# Patient Record
Sex: MALE | Race: WHITE | NOT HISPANIC OR LATINO | Employment: OTHER | URBAN - METROPOLITAN AREA
[De-identification: names, ages, dates, MRNs, and addresses within clinical notes are randomized per-mention and may not be internally consistent; named-entity substitution may affect disease eponyms.]

---

## 2018-11-12 ENCOUNTER — OFFICE VISIT CONVERTED (OUTPATIENT)
Dept: GASTROENTEROLOGY | Facility: CLINIC | Age: 68
End: 2018-11-12
Attending: PHYSICIAN ASSISTANT

## 2021-05-16 VITALS
HEART RATE: 66 BPM | WEIGHT: 187 LBS | BODY MASS INDEX: 29.35 KG/M2 | HEIGHT: 67 IN | OXYGEN SATURATION: 100 % | SYSTOLIC BLOOD PRESSURE: 127 MMHG | DIASTOLIC BLOOD PRESSURE: 67 MMHG

## 2022-09-06 ENCOUNTER — APPOINTMENT (OUTPATIENT)
Dept: CARDIOLOGY | Facility: HOSPITAL | Age: 72
End: 2022-09-06

## 2022-09-06 ENCOUNTER — APPOINTMENT (OUTPATIENT)
Dept: CT IMAGING | Facility: HOSPITAL | Age: 72
End: 2022-09-06

## 2022-09-06 ENCOUNTER — APPOINTMENT (OUTPATIENT)
Dept: GENERAL RADIOLOGY | Facility: HOSPITAL | Age: 72
End: 2022-09-06

## 2022-09-06 ENCOUNTER — HOSPITAL ENCOUNTER (INPATIENT)
Facility: HOSPITAL | Age: 72
LOS: 3 days | Discharge: HOME | End: 2022-09-09
Attending: EMERGENCY MEDICINE | Admitting: FAMILY MEDICINE

## 2022-09-06 DIAGNOSIS — R26.2 DIFFICULTY IN WALKING: ICD-10-CM

## 2022-09-06 DIAGNOSIS — I26.99 MULTIPLE PULMONARY EMBOLI: Primary | ICD-10-CM

## 2022-09-06 DIAGNOSIS — I82.4Y2 ACUTE DEEP VEIN THROMBOSIS (DVT) OF PROXIMAL VEIN OF LEFT LOWER EXTREMITY: ICD-10-CM

## 2022-09-06 LAB
ALBUMIN SERPL-MCNC: 3.6 G/DL (ref 3.5–5.2)
ALBUMIN/GLOB SERPL: 1.1 G/DL
ALP SERPL-CCNC: 64 U/L (ref 39–117)
ALT SERPL W P-5'-P-CCNC: 15 U/L (ref 1–41)
ANION GAP SERPL CALCULATED.3IONS-SCNC: 8.9 MMOL/L (ref 5–15)
ANION GAP SERPL CALCULATED.3IONS-SCNC: 9.3 MMOL/L (ref 5–15)
APTT PPP: 27.5 SECONDS (ref 24.2–34.2)
AST SERPL-CCNC: 14 U/L (ref 1–40)
BASOPHILS # BLD AUTO: 0.04 10*3/MM3 (ref 0–0.2)
BASOPHILS # BLD AUTO: 0.05 10*3/MM3 (ref 0–0.2)
BASOPHILS NFR BLD AUTO: 0.5 % (ref 0–1.5)
BASOPHILS NFR BLD AUTO: 0.7 % (ref 0–1.5)
BH CV LOW VAS LEFT COMMON FEMORAL SPONT: 1
BH CV LOW VAS LEFT DISTAL FEMORAL SPONT: 1
BH CV LOW VAS LEFT EXTERNAL ILIAC AUGMENT: NORMAL
BH CV LOW VAS LEFT EXTERNAL ILIAC COMPRESS: NORMAL
BH CV LOW VAS LEFT GASTRONEMIUS VESSEL: 1
BH CV LOW VAS LEFT MID FEMORAL SPONT: 1
BH CV LOW VAS LEFT PERONEAL VESSEL: 1
BH CV LOW VAS LEFT POPLITEAL SPONT: 1
BH CV LOW VAS LEFT POSTERIOR TIBIAL VESSEL: 1
BH CV LOW VAS LEFT PROXIMAL FEMORAL SPONT: 1
BH CV LOW VAS LEFT SOLEAL VESSEL: 1
BH CV LOWER VASCULAR LEFT COMMON FEMORAL AUGMENT: NORMAL
BH CV LOWER VASCULAR LEFT COMMON FEMORAL COMPETENT: NORMAL
BH CV LOWER VASCULAR LEFT COMMON FEMORAL COMPRESS: NORMAL
BH CV LOWER VASCULAR LEFT COMMON FEMORAL PHASIC: NORMAL
BH CV LOWER VASCULAR LEFT COMMON FEMORAL SPONT: NORMAL
BH CV LOWER VASCULAR LEFT COMMON FEMORAL THROMBUS: NORMAL
BH CV LOWER VASCULAR LEFT DISTAL FEMORAL AUGMENT: NORMAL
BH CV LOWER VASCULAR LEFT DISTAL FEMORAL COMPETENT: NORMAL
BH CV LOWER VASCULAR LEFT DISTAL FEMORAL COMPRESS: NORMAL
BH CV LOWER VASCULAR LEFT DISTAL FEMORAL PHASIC: NORMAL
BH CV LOWER VASCULAR LEFT DISTAL FEMORAL SPONT: NORMAL
BH CV LOWER VASCULAR LEFT DISTAL FEMORAL THROMBUS: NORMAL
BH CV LOWER VASCULAR LEFT EXTERNAL ILIAC COMPETENT: NORMAL
BH CV LOWER VASCULAR LEFT EXTERNAL ILIAC PHASIC: NORMAL
BH CV LOWER VASCULAR LEFT EXTERNAL ILIAC SPONT: NORMAL
BH CV LOWER VASCULAR LEFT GASTRONEMIUS COMPRESS: NORMAL
BH CV LOWER VASCULAR LEFT GASTRONEMIUS THROMBUS: NORMAL
BH CV LOWER VASCULAR LEFT GREATER SAPH AK COMPRESS: NORMAL
BH CV LOWER VASCULAR LEFT GREATER SAPH BK COMPRESS: NORMAL
BH CV LOWER VASCULAR LEFT MID FEMORAL AUGMENT: NORMAL
BH CV LOWER VASCULAR LEFT MID FEMORAL COMPETENT: NORMAL
BH CV LOWER VASCULAR LEFT MID FEMORAL COMPRESS: NORMAL
BH CV LOWER VASCULAR LEFT MID FEMORAL PHASIC: NORMAL
BH CV LOWER VASCULAR LEFT MID FEMORAL SPONT: NORMAL
BH CV LOWER VASCULAR LEFT MID FEMORAL THROMBUS: NORMAL
BH CV LOWER VASCULAR LEFT PERONEAL COMPRESS: NORMAL
BH CV LOWER VASCULAR LEFT PERONEAL THROMBUS: NORMAL
BH CV LOWER VASCULAR LEFT POPLITEAL AUGMENT: NORMAL
BH CV LOWER VASCULAR LEFT POPLITEAL COMPETENT: NORMAL
BH CV LOWER VASCULAR LEFT POPLITEAL COMPRESS: NORMAL
BH CV LOWER VASCULAR LEFT POPLITEAL PHASIC: NORMAL
BH CV LOWER VASCULAR LEFT POPLITEAL SPONT: NORMAL
BH CV LOWER VASCULAR LEFT POPLITEAL THROMBUS: NORMAL
BH CV LOWER VASCULAR LEFT POSTERIOR TIBIAL COMPRESS: NORMAL
BH CV LOWER VASCULAR LEFT POSTERIOR TIBIAL THROMBUS: NORMAL
BH CV LOWER VASCULAR LEFT PROXIMAL FEMORAL AUGMENT: NORMAL
BH CV LOWER VASCULAR LEFT PROXIMAL FEMORAL COMPETENT: NORMAL
BH CV LOWER VASCULAR LEFT PROXIMAL FEMORAL COMPRESS: NORMAL
BH CV LOWER VASCULAR LEFT PROXIMAL FEMORAL PHASIC: NORMAL
BH CV LOWER VASCULAR LEFT PROXIMAL FEMORAL SPONT: NORMAL
BH CV LOWER VASCULAR LEFT PROXIMAL FEMORAL THROMBUS: NORMAL
BH CV LOWER VASCULAR LEFT SAPHENOFEMORAL JUNCTION COMPRESS: NORMAL
BH CV LOWER VASCULAR LEFT SOLEAL COMPRESS: NORMAL
BH CV LOWER VASCULAR LEFT SOLEAL THROMBUS: NORMAL
BH CV LOWER VASCULAR RIGHT COMMON FEMORAL AUGMENT: NORMAL
BH CV LOWER VASCULAR RIGHT COMMON FEMORAL COMPETENT: NORMAL
BH CV LOWER VASCULAR RIGHT COMMON FEMORAL COMPRESS: NORMAL
BH CV LOWER VASCULAR RIGHT COMMON FEMORAL PHASIC: NORMAL
BH CV LOWER VASCULAR RIGHT COMMON FEMORAL SPONT: NORMAL
BILIRUB SERPL-MCNC: 0.6 MG/DL (ref 0–1.2)
BUN SERPL-MCNC: 21 MG/DL (ref 8–23)
BUN SERPL-MCNC: 22 MG/DL (ref 8–23)
BUN/CREAT SERPL: 15.9 (ref 7–25)
BUN/CREAT SERPL: 16.3 (ref 7–25)
CALCIUM SPEC-SCNC: 8.8 MG/DL (ref 8.6–10.5)
CALCIUM SPEC-SCNC: 9 MG/DL (ref 8.6–10.5)
CHLORIDE SERPL-SCNC: 105 MMOL/L (ref 98–107)
CHLORIDE SERPL-SCNC: 105 MMOL/L (ref 98–107)
CO2 SERPL-SCNC: 21.7 MMOL/L (ref 22–29)
CO2 SERPL-SCNC: 25.1 MMOL/L (ref 22–29)
CREAT SERPL-MCNC: 1.29 MG/DL (ref 0.76–1.27)
CREAT SERPL-MCNC: 1.38 MG/DL (ref 0.76–1.27)
DEPRECATED RDW RBC AUTO: 37.4 FL (ref 37–54)
DEPRECATED RDW RBC AUTO: 40.4 FL (ref 37–54)
EGFRCR SERPLBLD CKD-EPI 2021: 55 ML/MIN/1.73
EGFRCR SERPLBLD CKD-EPI 2021: 59.6 ML/MIN/1.73
EOSINOPHIL # BLD AUTO: 0.43 10*3/MM3 (ref 0–0.4)
EOSINOPHIL # BLD AUTO: 0.43 10*3/MM3 (ref 0–0.4)
EOSINOPHIL NFR BLD AUTO: 5.5 % (ref 0.3–6.2)
EOSINOPHIL NFR BLD AUTO: 5.7 % (ref 0.3–6.2)
ERYTHROCYTE [DISTWIDTH] IN BLOOD BY AUTOMATED COUNT: 12.2 % (ref 12.3–15.4)
ERYTHROCYTE [DISTWIDTH] IN BLOOD BY AUTOMATED COUNT: 12.3 % (ref 12.3–15.4)
GLOBULIN UR ELPH-MCNC: 3.3 GM/DL
GLUCOSE SERPL-MCNC: 117 MG/DL (ref 65–99)
GLUCOSE SERPL-MCNC: 89 MG/DL (ref 65–99)
HCT VFR BLD AUTO: 38.8 % (ref 37.5–51)
HCT VFR BLD AUTO: 39.7 % (ref 37.5–51)
HGB BLD-MCNC: 12.6 G/DL (ref 13–17.7)
HGB BLD-MCNC: 13.3 G/DL (ref 13–17.7)
HOLD SPECIMEN: NORMAL
HOLD SPECIMEN: NORMAL
IMM GRANULOCYTES # BLD AUTO: 0.04 10*3/MM3 (ref 0–0.05)
IMM GRANULOCYTES # BLD AUTO: 0.05 10*3/MM3 (ref 0–0.05)
IMM GRANULOCYTES NFR BLD AUTO: 0.5 % (ref 0–0.5)
IMM GRANULOCYTES NFR BLD AUTO: 0.6 % (ref 0–0.5)
INR PPP: 1.12 (ref 0.86–1.15)
LYMPHOCYTES # BLD AUTO: 1.21 10*3/MM3 (ref 0.7–3.1)
LYMPHOCYTES # BLD AUTO: 1.64 10*3/MM3 (ref 0.7–3.1)
LYMPHOCYTES NFR BLD AUTO: 15.6 % (ref 19.6–45.3)
LYMPHOCYTES NFR BLD AUTO: 21.9 % (ref 19.6–45.3)
MAXIMAL PREDICTED HEART RATE: 150 BPM
MCH RBC QN AUTO: 28.6 PG (ref 26.6–33)
MCH RBC QN AUTO: 29.4 PG (ref 26.6–33)
MCHC RBC AUTO-ENTMCNC: 32.5 G/DL (ref 31.5–35.7)
MCHC RBC AUTO-ENTMCNC: 33.5 G/DL (ref 31.5–35.7)
MCV RBC AUTO: 85.4 FL (ref 79–97)
MCV RBC AUTO: 90.4 FL (ref 79–97)
MONOCYTES # BLD AUTO: 0.6 10*3/MM3 (ref 0.1–0.9)
MONOCYTES # BLD AUTO: 0.61 10*3/MM3 (ref 0.1–0.9)
MONOCYTES NFR BLD AUTO: 7.9 % (ref 5–12)
MONOCYTES NFR BLD AUTO: 8 % (ref 5–12)
NEUTROPHILS NFR BLD AUTO: 4.73 10*3/MM3 (ref 1.7–7)
NEUTROPHILS NFR BLD AUTO: 5.43 10*3/MM3 (ref 1.7–7)
NEUTROPHILS NFR BLD AUTO: 63.2 % (ref 42.7–76)
NEUTROPHILS NFR BLD AUTO: 69.9 % (ref 42.7–76)
NRBC BLD AUTO-RTO: 0 /100 WBC (ref 0–0.2)
NRBC BLD AUTO-RTO: 0 /100 WBC (ref 0–0.2)
NT-PROBNP SERPL-MCNC: 95 PG/ML (ref 0–900)
PLATELET # BLD AUTO: 112 10*3/MM3 (ref 140–450)
PLATELET # BLD AUTO: 145 10*3/MM3 (ref 140–450)
PMV BLD AUTO: 9.4 FL (ref 6–12)
PMV BLD AUTO: 9.4 FL (ref 6–12)
POTASSIUM SERPL-SCNC: 4.1 MMOL/L (ref 3.5–5.2)
POTASSIUM SERPL-SCNC: 4.1 MMOL/L (ref 3.5–5.2)
PROT SERPL-MCNC: 6.9 G/DL (ref 6–8.5)
PROTHROMBIN TIME: 14.6 SECONDS (ref 11.8–14.9)
RBC # BLD AUTO: 4.29 10*6/MM3 (ref 4.14–5.8)
RBC # BLD AUTO: 4.65 10*6/MM3 (ref 4.14–5.8)
SODIUM SERPL-SCNC: 136 MMOL/L (ref 136–145)
SODIUM SERPL-SCNC: 139 MMOL/L (ref 136–145)
STRESS TARGET HR: 128 BPM
TROPONIN T SERPL-MCNC: <0.01 NG/ML (ref 0–0.03)
TROPONIN T SERPL-MCNC: <0.01 NG/ML (ref 0–0.03)
WBC NRBC COR # BLD: 7.49 10*3/MM3 (ref 3.4–10.8)
WBC NRBC COR # BLD: 7.77 10*3/MM3 (ref 3.4–10.8)
WHOLE BLOOD HOLD COAG: NORMAL
WHOLE BLOOD HOLD SPECIMEN: NORMAL

## 2022-09-06 PROCEDURE — 93005 ELECTROCARDIOGRAM TRACING: CPT | Performed by: EMERGENCY MEDICINE

## 2022-09-06 PROCEDURE — 80053 COMPREHEN METABOLIC PANEL: CPT | Performed by: STUDENT IN AN ORGANIZED HEALTH CARE EDUCATION/TRAINING PROGRAM

## 2022-09-06 PROCEDURE — 84484 ASSAY OF TROPONIN QUANT: CPT | Performed by: STUDENT IN AN ORGANIZED HEALTH CARE EDUCATION/TRAINING PROGRAM

## 2022-09-06 PROCEDURE — 85730 THROMBOPLASTIN TIME PARTIAL: CPT | Performed by: EMERGENCY MEDICINE

## 2022-09-06 PROCEDURE — 93971 EXTREMITY STUDY: CPT

## 2022-09-06 PROCEDURE — 99284 EMERGENCY DEPT VISIT MOD MDM: CPT

## 2022-09-06 PROCEDURE — 94799 UNLISTED PULMONARY SVC/PX: CPT

## 2022-09-06 PROCEDURE — 71260 CT THORAX DX C+: CPT

## 2022-09-06 PROCEDURE — 85610 PROTHROMBIN TIME: CPT | Performed by: EMERGENCY MEDICINE

## 2022-09-06 PROCEDURE — 93971 EXTREMITY STUDY: CPT | Performed by: SURGERY

## 2022-09-06 PROCEDURE — 84484 ASSAY OF TROPONIN QUANT: CPT | Performed by: EMERGENCY MEDICINE

## 2022-09-06 PROCEDURE — 93005 ELECTROCARDIOGRAM TRACING: CPT

## 2022-09-06 PROCEDURE — 0 IOPAMIDOL PER 1 ML: Performed by: EMERGENCY MEDICINE

## 2022-09-06 PROCEDURE — 71045 X-RAY EXAM CHEST 1 VIEW: CPT

## 2022-09-06 PROCEDURE — 85025 COMPLETE CBC W/AUTO DIFF WBC: CPT | Performed by: EMERGENCY MEDICINE

## 2022-09-06 PROCEDURE — 25010000002 HEPARIN (PORCINE) 25000-0.45 UT/250ML-% SOLUTION: Performed by: EMERGENCY MEDICINE

## 2022-09-06 PROCEDURE — 99222 1ST HOSP IP/OBS MODERATE 55: CPT | Performed by: STUDENT IN AN ORGANIZED HEALTH CARE EDUCATION/TRAINING PROGRAM

## 2022-09-06 PROCEDURE — 83880 ASSAY OF NATRIURETIC PEPTIDE: CPT | Performed by: EMERGENCY MEDICINE

## 2022-09-06 RX ORDER — HEPARIN SODIUM 10000 [USP'U]/100ML
18 INJECTION, SOLUTION INTRAVENOUS
Status: DISCONTINUED | OUTPATIENT
Start: 2022-09-06 | End: 2022-09-08

## 2022-09-06 RX ORDER — SODIUM CHLORIDE 0.9 % (FLUSH) 0.9 %
10 SYRINGE (ML) INJECTION AS NEEDED
Status: DISCONTINUED | OUTPATIENT
Start: 2022-09-06 | End: 2022-09-09 | Stop reason: HOSPADM

## 2022-09-06 RX ORDER — NITROGLYCERIN 0.4 MG/1
0.4 TABLET SUBLINGUAL
Status: DISCONTINUED | OUTPATIENT
Start: 2022-09-06 | End: 2022-09-09 | Stop reason: HOSPADM

## 2022-09-06 RX ORDER — SODIUM CHLORIDE 9 MG/ML
40 INJECTION, SOLUTION INTRAVENOUS AS NEEDED
Status: DISCONTINUED | OUTPATIENT
Start: 2022-09-06 | End: 2022-09-09 | Stop reason: HOSPADM

## 2022-09-06 RX ORDER — SODIUM CHLORIDE 0.9 % (FLUSH) 0.9 %
10 SYRINGE (ML) INJECTION EVERY 12 HOURS SCHEDULED
Status: DISCONTINUED | OUTPATIENT
Start: 2022-09-07 | End: 2022-09-09 | Stop reason: HOSPADM

## 2022-09-06 RX ADMIN — IOPAMIDOL 100 ML: 755 INJECTION, SOLUTION INTRAVENOUS at 20:25

## 2022-09-06 RX ADMIN — Medication 10 ML: at 23:20

## 2022-09-06 RX ADMIN — HEPARIN SODIUM 18 UNITS/KG/HR: 10000 INJECTION, SOLUTION INTRAVENOUS at 23:06

## 2022-09-06 NOTE — ED PROVIDER NOTES
"Time: 5:17 PM EDT  Arrived by: private car  Chief Complaint: leg swelling, shortness of breath  History provided by: patient  History is limited by: N/A     History of Present Illness:  Patient is a 72 y.o. year old male who presents to the emergency department with leg swelling and shortness of breath.    Pt states he has left leg swelling starting 4 days ago with increasing pain. Pt states he has never had this problem before and denies previous blood clots and denies taking any blood thinners. Pt states he has also had some difficulty taking a deep breath.     Pt states he had covid two weeks ago.       Similar Symptoms Previously: no  Recently seen: no      Patient Care Team  Primary Care Provider: Provider, No Known    Past Medical History:     Not on File  No past medical history on file.  No past surgical history on file.  No family history on file.    Home Medications:  Prior to Admission medications    Not on File        Social History:      Recent travel: not applicable     Review of Systems:  Review of Systems   Constitutional: Negative for chills and fever.   HENT: Negative for congestion, ear pain and sore throat.    Eyes: Negative for pain.   Respiratory: Positive for shortness of breath. Negative for cough and chest tightness.    Cardiovascular: Negative for chest pain.   Gastrointestinal: Negative for abdominal pain, diarrhea, nausea and vomiting.   Genitourinary: Negative for flank pain and hematuria.   Musculoskeletal: Negative for joint swelling.        Leg swelling   Skin: Negative for pallor.   Neurological: Negative for seizures and headaches.   All other systems reviewed and are negative.       Physical Exam:  /68   Pulse 86   Temp 99.1 °F (37.3 °C) (Oral)   Resp 24   Ht 170.2 cm (67\")   Wt 88.5 kg (195 lb)   SpO2 96%   BMI 30.54 kg/m²     Physical Exam  Vitals and nursing note reviewed.   Constitutional:       General: He is not in acute distress.     Appearance: Normal " appearance. He is not toxic-appearing.   HENT:      Head: Normocephalic and atraumatic.      Jaw: There is normal jaw occlusion.   Eyes:      General: Lids are normal.      Extraocular Movements: Extraocular movements intact.      Conjunctiva/sclera: Conjunctivae normal.      Pupils: Pupils are equal, round, and reactive to light.   Cardiovascular:      Rate and Rhythm: Normal rate and regular rhythm.      Pulses: Normal pulses.      Heart sounds: Murmur heard.    Systolic murmur is present with a grade of 2/6.  Pulmonary:      Effort: Pulmonary effort is normal. No respiratory distress.      Breath sounds: Normal breath sounds. No wheezing or rhonchi.   Abdominal:      General: Abdomen is flat.      Palpations: Abdomen is soft.      Tenderness: There is no abdominal tenderness. There is no guarding or rebound.   Musculoskeletal:         General: Normal range of motion.      Cervical back: Normal range of motion and neck supple.      Right lower leg: No edema.      Left lower le+ Pitting Edema present.      Comments:  Left leg shows Increased warmth and erythema    Skin:     General: Skin is warm and dry.   Neurological:      Mental Status: He is alert and oriented to person, place, and time. Mental status is at baseline.   Psychiatric:         Mood and Affect: Mood normal.                Medications in the Emergency Department:  Medications   heparin bolus from bag 7,100 Units (has no administration in time range)   heparin 88800 units/250 mL (100 units/mL) in 0.45 % NaCl infusion (has no administration in time range)   heparin bolus from bag 4,400 Units (has no administration in time range)   heparin bolus from bag 2,200 Units (has no administration in time range)   iopamidol (ISOVUE-370) 76 % injection 100 mL (100 mL Intravenous Given 22)        Labs  Lab Results (last 24 hours)     Procedure Component Value Units Date/Time    CBC & Differential [[id number]]  (Abnormal) Collected: 22 0994     Specimen: Blood Updated: 09/06/22 1709    Narrative:      The following orders were created for panel order CBC & Differential.  Procedure                               Abnormality         Status                     ---------                               -----------         ------                     CBC Auto Differential[[id number]]        Abnormal            Final result                 Please view results for these tests on the individual orders.    Comprehensive Metabolic Panel [[id number]]  (Abnormal) Collected: 09/06/22 1654    Specimen: Blood Updated: 09/06/22 1733     Glucose 117 mg/dL      BUN 22 mg/dL      Creatinine 1.38 mg/dL      Sodium 139 mmol/L      Potassium 4.1 mmol/L      Chloride 105 mmol/L      CO2 25.1 mmol/L      Calcium 9.0 mg/dL      Total Protein 6.9 g/dL      Albumin 3.60 g/dL      ALT (SGPT) 15 U/L      AST (SGOT) 14 U/L      Alkaline Phosphatase 64 U/L      Total Bilirubin 0.6 mg/dL      Globulin 3.3 gm/dL      A/G Ratio 1.1 g/dL      BUN/Creatinine Ratio 15.9     Anion Gap 8.9 mmol/L      eGFR 55.0 mL/min/1.73      Comment: National Kidney Foundation and American Society of Nephrology (ASN) Task Force recommended calculation based on the Chronic Kidney Disease Epidemiology Collaboration (CKD-EPI) equation refit without adjustment for race.       Narrative:      GFR Normal >60  Chronic Kidney Disease <60  Kidney Failure <15      CBC Auto Differential [[id number]]  (Abnormal) Collected: 09/06/22 1654    Specimen: Blood Updated: 09/06/22 1709     WBC 7.77 10*3/mm3      RBC 4.65 10*6/mm3      Hemoglobin 13.3 g/dL      Hematocrit 39.7 %      MCV 85.4 fL      MCH 28.6 pg      MCHC 33.5 g/dL      RDW 12.2 %      RDW-SD 37.4 fl      MPV 9.4 fL      Platelets 145 10*3/mm3      Neutrophil % 69.9 %      Lymphocyte % 15.6 %      Monocyte % 7.9 %      Eosinophil % 5.5 %      Basophil % 0.5 %      Immature Grans % 0.6 %      Neutrophils, Absolute 5.43 10*3/mm3      Lymphocytes, Absolute 1.21 10*3/mm3       Monocytes, Absolute 0.61 10*3/mm3      Eosinophils, Absolute 0.43 10*3/mm3      Basophils, Absolute 0.04 10*3/mm3      Immature Grans, Absolute 0.05 10*3/mm3      nRBC 0.0 /100 WBC     BNP [[id number]]  (Normal) Collected: 09/06/22 1654    Specimen: Blood Updated: 09/06/22 1731     proBNP 95.0 pg/mL     Narrative:      Among patients with dyspnea, NT-proBNP is highly sensitive for the detection of acute congestive heart failure. In addition NT-proBNP of <300 pg/ml effectively rules out acute congestive heart failure with 99% negative predictive value.    Results may be falsely decreased if patient taking Biotin.      Troponin [[id number]]  (Normal) Collected: 09/06/22 1654    Specimen: Blood Updated: 09/06/22 1733     Troponin T <0.010 ng/mL     Narrative:      Troponin T Reference Range:  <= 0.03 ng/mL-   Negative for AMI  >0.03 ng/mL-     Abnormal for myocardial necrosis.  Clinicians would have to utilize clinical acumen, EKG, Troponin and serial changes to determine if it is an Acute Myocardial Infarction or myocardial injury due to an underlying chronic condition.       Results may be falsely decreased if patient taking Biotin.             Imaging:  CT Chest With Contrast Diagnostic    Result Date: 9/6/2022  PROCEDURE: CT CHEST W CONTRAST DIAGNOSTIC  COMPARISON:  [facility], CT, ABDOMEN/PELVIS WITH CONTRAST, 2/18/2021, 17:14. INDICATIONS: Pulmonary embolism (PE) suspected, unknown D-dimer  TECHNIQUE: After obtaining the patient's consent, CT images were obtained with non-ionic intravenous contrast material.   PROTOCOL:   Standard imaging protocol performed    RADIATION:      Automated exposure control was utilized to minimize radiation dose.  FINDINGS: There are well-defined filling defects at the distal right pulmonary artery extending through the bifurcation to involve the proximal segmental arteries of the right upper lobe, right middle lobe, and right lower lobe.  Small filling defects are  seen within the segmental to subsegmental arteries of the left lower lobe and lingular pulmonary arteries.  Findings indicate the presence of small to moderate bilateral pulmonary emboli.  There is no large or central embolus.   Mild to moderate breathing motion artifact is seen bilaterally. Scattered atelectasis is noted. No well-defined consolidations or significant pleural effusions are observed. No dominant pulmonary nodules or abnormal pulmonary masses are seen.  No significant hilar, mediastinal, or axillary lymphadenopathy is seen. A normal aortic arch branching pattern is noted.  Severe coronary artery calcifications are observed.  The thyroid gland is unremarkable. The esophagus is unremarkable.  The limited evaluation of the upper abdomen demonstrates no definitive acute abnormalities.  No acute osseous abnormalities are seen.         1. Evidence for small to moderate bilateral pulmonary emboli.  There is no large central or saddle embolus.  2. No evidence for additional acute intrathoracic abnormality. 3. Evidence for severe coronary artery calcifications.    TOM DAVIES MD       Electronically Signed and Approved By: TOM DAVIES MD on 9/06/2022 at 20:54             XR Chest 1 View    Result Date: 9/6/2022  PROCEDURE: XR CHEST 1 VW  COMPARISON: [facility], CR, CHEST AP/PA 1 VIEW, 2/18/2021, 15:50.  INDICATIONS: SHORTNESS OF BREATH, LOWER EXTREMITY SWELLING  FINDINGS:  No new consolidations or pleural effusions are observed. The cardiac silhouette and mediastinum are unchanged. No definitive acute osseous abnormalities are seen on this single view.        1. No change from the previous study with no evidence for acute cardiopulmonary process.         TOM DAVIES MD       Electronically Signed and Approved By: TOM DAVIES MD on 9/06/2022 at 17:55             Duplex Venous Lower Extremity - Left CAR    Result Date: 9/6/2022  · Acute left lower extremity deep vein thrombosis noted in  the common femoral, proximal femoral, mid femoral, distal femoral, popliteal, posterial tibial, peroneal, gastrocnemius and soleal.        Procedures:  Procedures    Progress  ED Course as of 09/06/22 2129   Tue Sep 06, 2022   1725 EKG:    Rhythm: Normal sinus  Rate: 95  Axis: Normal  Intervals: Normal  ST Segment: Normal    EKG Comparison: Unchanged    Interpreted by me   [TC]      ED Course User Index  [TC] Prabhakar Campos MD                            Medical Decision Making:  MDM  Number of Diagnoses or Management Options  Acute deep vein thrombosis (DVT) of proximal vein of left lower extremity (HCC)  Multiple pulmonary emboli (HCC)  Diagnosis management comments: In summary this is a 72-year-old male who presents to the emergency department for evaluation of left leg pain and shortness of air with chest pain.  On evaluation he does have left lower extremity erythema increased warmth and swelling.  His work-up including CBC and CMP are unremarkable.  Troponin is unremarkable.  EKG shows no acute ischemia and no right heart strain.  Duplex ultrasound of the left lower extremity confirms large DVT extending to the common femoral vein.  CTA of the chest reveals multiple bilateral pulmonary emboli.  Patient has been started on a heparin drip will be admitted to the hospital for further treatment.  Has been placed on 2 L nasal cannula oxygen.       Amount and/or Complexity of Data Reviewed  Clinical lab tests: reviewed  Tests in the medicine section of CPT®: reviewed         Final diagnoses:   Multiple pulmonary emboli (HCC)   Acute deep vein thrombosis (DVT) of proximal vein of left lower extremity (HCC)        Disposition:  ED Disposition     ED Disposition   Decision to Admit    Condition   --    Comment   --             This medical record created using voice recognition software.        Documentation assistance provided by DANA PEREZ acting as scribe for Prabhakar Campos MD. Information recorded by the  scribe was done at my direction and has been verified and validated by me.          Chico Bates  09/06/22 1726       Prabhakar Campos MD  09/06/22 5579

## 2022-09-07 ENCOUNTER — APPOINTMENT (OUTPATIENT)
Dept: CARDIOLOGY | Facility: HOSPITAL | Age: 72
End: 2022-09-07

## 2022-09-07 LAB
ANION GAP SERPL CALCULATED.3IONS-SCNC: 8.8 MMOL/L (ref 5–15)
APTT PPP: 117 SECONDS (ref 24.2–34.2)
APTT PPP: 89.4 SECONDS (ref 24.2–34.2)
APTT PPP: 97.9 SECONDS (ref 24.2–34.2)
ASCENDING AORTA: 3.3 CM
BASOPHILS # BLD AUTO: 0.03 10*3/MM3 (ref 0–0.2)
BASOPHILS NFR BLD AUTO: 0.4 % (ref 0–1.5)
BH CV ECHO MEAS - AO MAX PG: 15 MMHG
BH CV ECHO MEAS - AO MEAN PG: 9 MMHG
BH CV ECHO MEAS - AO ROOT DIAM: 2.8 CM
BH CV ECHO MEAS - AO V2 MAX: 194 CM/SEC
BH CV ECHO MEAS - AO V2 VTI: 46 CM
BH CV ECHO MEAS - AVA(I,D): 1.6 CM2
BH CV ECHO MEAS - EDV(MOD-SP2): 77 ML
BH CV ECHO MEAS - EDV(MOD-SP4): 67 ML
BH CV ECHO MEAS - EF(MOD-BP): 60 %
BH CV ECHO MEAS - ESV(MOD-SP2): 29 ML
BH CV ECHO MEAS - ESV(MOD-SP4): 29 ML
BH CV ECHO MEAS - IVSD: 0.9 CM
BH CV ECHO MEAS - LA DIMENSION: 3.4 CM
BH CV ECHO MEAS - LAT PEAK E' VEL: 9 CM/SEC
BH CV ECHO MEAS - LV MAX PG: 5 MMHG
BH CV ECHO MEAS - LV MEAN PG: 3 MMHG
BH CV ECHO MEAS - LV V1 MAX: 107 CM/SEC
BH CV ECHO MEAS - LV V1 VTI: 23 CM
BH CV ECHO MEAS - LVIDD: 3.8 CM
BH CV ECHO MEAS - LVIDS: 2.6 CM
BH CV ECHO MEAS - LVOT DIAM: 2 CM
BH CV ECHO MEAS - LVPWD: 1 CM
BH CV ECHO MEAS - MED PEAK E' VEL: 6.85 CM/SEC
BH CV ECHO MEAS - MV A MAX VEL: 92 CM/SEC
BH CV ECHO MEAS - MV DEC TIME: 255 MSEC
BH CV ECHO MEAS - MV E MAX VEL: 91 CM/SEC
BH CV ECHO MEAS - MV E/A: 1
BH CV ECHO MEAS - RAP SYSTOLE: 3 MMHG
BH CV ECHO MEAS - RVDD: 2.4 CM
BH CV ECHO MEAS - RVSP: 16 MMHG
BH CV ECHO MEAS - TR MAX PG: 13 MMHG
BH CV ECHO MEAS - TR MAX VEL: 179 CM/SEC
BH CV ECHO MEASUREMENTS AVERAGE E/E' RATIO: 11.48
BUN SERPL-MCNC: 17 MG/DL (ref 8–23)
BUN/CREAT SERPL: 12.5 (ref 7–25)
CALCIUM SPEC-SCNC: 8.7 MG/DL (ref 8.6–10.5)
CHLORIDE SERPL-SCNC: 102 MMOL/L (ref 98–107)
CO2 SERPL-SCNC: 23.2 MMOL/L (ref 22–29)
CREAT SERPL-MCNC: 1.36 MG/DL (ref 0.76–1.27)
DEPRECATED RDW RBC AUTO: 37.5 FL (ref 37–54)
EGFRCR SERPLBLD CKD-EPI 2021: 56 ML/MIN/1.73
EOSINOPHIL # BLD AUTO: 0.49 10*3/MM3 (ref 0–0.4)
EOSINOPHIL NFR BLD AUTO: 6.8 % (ref 0.3–6.2)
ERYTHROCYTE [DISTWIDTH] IN BLOOD BY AUTOMATED COUNT: 12.2 % (ref 12.3–15.4)
GLUCOSE SERPL-MCNC: 104 MG/DL (ref 65–99)
HCT VFR BLD AUTO: 38.4 % (ref 37.5–51)
HGB BLD-MCNC: 13.2 G/DL (ref 13–17.7)
IMM GRANULOCYTES # BLD AUTO: 0.05 10*3/MM3 (ref 0–0.05)
IMM GRANULOCYTES NFR BLD AUTO: 0.7 % (ref 0–0.5)
IVRT: 87 MSEC
LEFT ATRIUM VOLUME INDEX: 23 ML/M2
LYMPHOCYTES # BLD AUTO: 2.13 10*3/MM3 (ref 0.7–3.1)
LYMPHOCYTES NFR BLD AUTO: 29.7 % (ref 19.6–45.3)
MAXIMAL PREDICTED HEART RATE: 150 BPM
MCH RBC QN AUTO: 29.3 PG (ref 26.6–33)
MCHC RBC AUTO-ENTMCNC: 34.4 G/DL (ref 31.5–35.7)
MCV RBC AUTO: 85.3 FL (ref 79–97)
MONOCYTES # BLD AUTO: 0.6 10*3/MM3 (ref 0.1–0.9)
MONOCYTES NFR BLD AUTO: 8.4 % (ref 5–12)
NEUTROPHILS NFR BLD AUTO: 3.87 10*3/MM3 (ref 1.7–7)
NEUTROPHILS NFR BLD AUTO: 54 % (ref 42.7–76)
NRBC BLD AUTO-RTO: 0 /100 WBC (ref 0–0.2)
PLATELET # BLD AUTO: 143 10*3/MM3 (ref 140–450)
PMV BLD AUTO: 9.3 FL (ref 6–12)
POTASSIUM SERPL-SCNC: 4.2 MMOL/L (ref 3.5–5.2)
QT INTERVAL: 335 MS
RBC # BLD AUTO: 4.5 10*6/MM3 (ref 4.14–5.8)
SODIUM SERPL-SCNC: 134 MMOL/L (ref 136–145)
STRESS TARGET HR: 128 BPM
TROPONIN T SERPL-MCNC: <0.01 NG/ML (ref 0–0.03)
WBC NRBC COR # BLD: 7.17 10*3/MM3 (ref 3.4–10.8)

## 2022-09-07 PROCEDURE — 85730 THROMBOPLASTIN TIME PARTIAL: CPT | Performed by: FAMILY MEDICINE

## 2022-09-07 PROCEDURE — 84484 ASSAY OF TROPONIN QUANT: CPT | Performed by: STUDENT IN AN ORGANIZED HEALTH CARE EDUCATION/TRAINING PROGRAM

## 2022-09-07 PROCEDURE — 93306 TTE W/DOPPLER COMPLETE: CPT | Performed by: SPECIALIST

## 2022-09-07 PROCEDURE — 80048 BASIC METABOLIC PNL TOTAL CA: CPT | Performed by: STUDENT IN AN ORGANIZED HEALTH CARE EDUCATION/TRAINING PROGRAM

## 2022-09-07 PROCEDURE — 85730 THROMBOPLASTIN TIME PARTIAL: CPT | Performed by: STUDENT IN AN ORGANIZED HEALTH CARE EDUCATION/TRAINING PROGRAM

## 2022-09-07 PROCEDURE — 85025 COMPLETE CBC W/AUTO DIFF WBC: CPT | Performed by: STUDENT IN AN ORGANIZED HEALTH CARE EDUCATION/TRAINING PROGRAM

## 2022-09-07 PROCEDURE — 94799 UNLISTED PULMONARY SVC/PX: CPT

## 2022-09-07 PROCEDURE — 99233 SBSQ HOSP IP/OBS HIGH 50: CPT | Performed by: FAMILY MEDICINE

## 2022-09-07 PROCEDURE — 25010000002 HEPARIN (PORCINE) 25000-0.45 UT/250ML-% SOLUTION: Performed by: EMERGENCY MEDICINE

## 2022-09-07 PROCEDURE — 99223 1ST HOSP IP/OBS HIGH 75: CPT | Performed by: INTERNAL MEDICINE

## 2022-09-07 PROCEDURE — 36415 COLL VENOUS BLD VENIPUNCTURE: CPT | Performed by: STUDENT IN AN ORGANIZED HEALTH CARE EDUCATION/TRAINING PROGRAM

## 2022-09-07 PROCEDURE — 93306 TTE W/DOPPLER COMPLETE: CPT

## 2022-09-07 RX ORDER — DIPHENHYDRAMINE HCL 25 MG
25 CAPSULE ORAL EVERY 6 HOURS PRN
COMMUNITY
End: 2022-12-16 | Stop reason: SDUPTHER

## 2022-09-07 RX ADMIN — Medication 10 ML: at 20:27

## 2022-09-07 RX ADMIN — Medication 10 ML: at 08:21

## 2022-09-07 RX ADMIN — HEPARIN SODIUM 18 UNITS/KG/HR: 10000 INJECTION, SOLUTION INTRAVENOUS at 11:19

## 2022-09-07 NOTE — PROGRESS NOTES
[facility]   Hospitalist Progress Note       Patient Name: Jordi MEJIA Jr [facility]  : 1950  MRN: [id number]  Primary Care Physician: Babak Lema MD  Date of admission: 2022  Today's Date: 2022  Room / Bed:   213/1  Subjective   Chief Complaint:  CP, SOA, LLE swelling / Acute DVT and acute bilat PE    Summary:   PT presents with LLE swelling that started on . Pt states that along with the leg swelling, he also developed a chest pain and shortness of breath that have now resolved. In ED, blood pressure 138/79, heart rate 92, respiratory rate of 12, temperature 99.1 and O2 saturation 96% on room air. Labs on arrival showed a sodium 139, potassium 4.1, BUN 22, creatinine 1.38, WBC 7.7, hemoglobin 13.30 platelets 145. CTA chest showed evidence for small to moderate bilateral pulmonary emboli.  Lower extremity duplex showed Acute left lower extremity deep vein thrombosis noted in the common femoral, proximal femoral, mid femoral, distal femoral, popliteal, posterial tibial, peroneal, gastrocnemius and soleal. At this time, he is complaining of LLE swelling and pain.     Interval Followup: 2022    LLE swelling better  On 1-2 L with good sats  Normotensive  No further chest pains.  Pt feeling better overall.  He wants to go home as soon as possible      REVIEW OF SYSTEMS: All other systems reviewed and are negative.   GEN: No fevers. No chills. No weight gain. No weight loss.     HEENT:   No dysphagia/odynophagia. No visual disturbance.    GI:    No N/V.  No abd pain. No diarrhea. No constipation.  No bloody/black tarry stools. No hematemesis.   CV: + chest discomfort.  No palps. No lightheadedness. No syncope. No orthopnea/PND. + LLE edema.   RESP:    No cough. No wheeze.  No increased sputum. No hemoptysis. + ALLEN.  :   No dysuria or suprapubic discomfort. No frequency.No urgency. No hesitancy. No incontinence. No hematuria. No flank pain.    MS:   No joint stiffness or arthralgias.  No myalgias. No muscle weakness.    SKIN:   No painful or pruritic rashes.  No skin discoloration.  NEURO:  No focal numbness or weakness.  No headaches.  No ataxia. No slurred speech. No receptive/expressive aphasia.      PSYCH:   No anxiety. No depression.  ENDO:  No tremor, hair loss, heat or cold intolerance.  Objective   Temp:  [97.9 °F (36.6 °C)-99.1 °F (37.3 °C)] 97.9 °F (36.6 °C)  Heart Rate:  [71-92] 87  Resp:  [12-24] 17  BP: (115-140)/(63-79) 134/63  Flow (L/min):  [1-2] 1  PHYSICAL EXAM   CON: WN. WD. NAD. Alert.  On 1L.  No work of breathing  EYES:  Sclera anicteric. EOMI. Normal conjunctiva.   ENT:  Oropharyngeal mucosa without ulcers or thrush.    NECK:  No thyromegaly. No stridor. Trachea midline.  RESP:  CTA. No wheezes. No crackles.  No work of breathing or tachypnea.   CV:  Rhythm regular. Rate WNL. No murmur noted.  LLE edema.  GI:  Soft and nontender. Nondistended.  Bowel sounds present.   EXT: Peripheral pulses intact.  No joint deformities or cyanosis.  LYMPH:  No lymphedema noted.  No cervical lymphadenopathy.  PSYCH:  Alert. Oriented. Normal affect and mood.  NEURO:  CNII-XII grossly intact. No dysarthria or aphasia. No unilateral weakness or paresthesia.  SKIN: No chronic venous stasis changes or varicosities.  No cellulitis    Results from last 7 days   Lab Units 09/07/22  0501 09/06/22 2210 09/06/22  1654   WBC 10*3/mm3 7.17 7.49 7.77   HEMOGLOBIN g/dL 13.2 12.6* 13.3   HEMATOCRIT % 38.4 38.8 39.7   PLATELETS 10*3/mm3 143 112* 145     Results from last 7 days   Lab Units 09/07/22  0501 09/06/22 2210 09/06/22  1654   SODIUM mmol/L 134* 136 139   POTASSIUM mmol/L 4.2 4.1 4.1   CO2 mmol/L 23.2 21.7* 25.1   CHLORIDE mmol/L 102 105 105   ANION GAP mmol/L 8.8 9.3 8.9   BUN mg/dL 17 21 22   CREATININE mg/dL 1.36* 1.29* 1.38*   GLUCOSE mg/dL 104* 89 117*     Results from last 7 days   Lab Units 09/06/22  2144   INR  1.12       RESULTS REVIEWED:  I have personally reviewed the results from the  time of this admission to 9/7/2022 10:23 EDT and agree with these findings:  []  Laboratory  []  Microbiology  []  Radiology  []  EKG/Telemetry   []  Cardiology/Vascular   []  Pathology  []  Old records  []  Other:  Assessment / Plan   Assessment:  Acute bilateral pulmonary emboli  Chest pain and SOA due to the above  Acute LLE DVT  HTN  CKD3  IBS  Hx of TIA/CVA  Hx of left basal ganglia hemorrhage with 1 mm midline shift thought to be 2/2 uncontrolled HTN (2018)  Coronary arteriosclerosis noted on CT imaging     Plan:  Monitored bed  Continue heparin gtt ---> plan to convert over to Eliquis  Echo ... pending  Pulmonology consulted.  Appreciate recommendations.  Wean O2 to keep sats > 90%  Discussed with  / regarding anticoagulation Rx coverage  Discussed plan with RN.  DVT prophylaxis:  Medical and mechanical DVT prophylaxis orders are present.  CODE STATUS:      Code Status (Patient has no pulse and is not breathing): CPR (Attempt to Resuscitate)  Medical Interventions (Patient has pulse or is breathing): Full Support       Electronically signed by IRAJ Antonio, 09/07/22, 10:23 AM EDT.    Attending documentation:  I reviewed the above documentation and independently reviewed and rounded and evaluated the patient and discussed the care plan with STEVEN Spangler PA-C, I agree with his findings and plan as documented, what I have added to the care plan and modified is as follows in my documentation and my medical decision making; 72-year-old male hospitalized on 9/6/2022 with shortness of breath, acute hypoxemic respiratory failure, found to have extensive left lower extremity DVT, multiple bilateral PEs, likely provoked by immobility, started on supplemental oxygen, echo pending, on heparin continuous infusion.  Pulmonary consulted.  Interval follow-up: Patient seen and examined this morning, no acute distress, no acute major night events, patient on 2 L nasal cannula with sats in the 90s.  Blood  pressures have been stable, telemetry reviewed, no acute major arrhythmic aunts.  Denies chest pain or palpitations.  Left leg pain and cramping persist.  Review of systems obtained, all systems reviewed and negative except for generalized fatigue, shortness of breath with exertion, hypoxemia.  Vitals reviewed, labs reviewed, monitoring PTT while on heparin continuous infusion.  On physical exam, well-appearing male in no acute distress, regular rate rhythm, clear auscultation bilaterally, soft nontender abdomen, left lower extremity slightly darker than the right lower extremity with trace edema.  Assessment as above, plan is to continue heparin continuous infusion with extensive PEs and DVT on the left side, monitor PTT, dose adjust heparin accordingly, telemetry monitoring, follow-up echocardiogram, consulted pulmonary discussed Dr. Gomez, recommendations appreciated, continue wean oxygen, a.m. labs, full code, DVT prophylax with heparin, clinical course and further management, discussed with nurse at the bedside.  More than 70% of the time of this patient's encounter was performed by me, this included face-to-face time, planning and coordinating, medical decision making and critical thinking personally done by me.  -CATALINA MORLEY    Electronically signed by Eri Gotti MD, 09/07/22, 7:18 PM EDT.  Portions of this documentation were transcribed electronically from a voice recognition software.  I confirm all data accurately represents the service(s) I performed at today's visit.

## 2022-09-07 NOTE — H&P
[facility]    Patient Identification:  Name:  Jordi Gerard  Age:  72 y.o.  Sex:  male  :  1950  MRN:  [id number]   Visit Number:  [id number]  Admit Date: 2022   Room number:    Primary Care Physician:  Provider, No Known    Date of Admission: 2022     Subjective     Chief complaint:    Chief Complaint   Patient presents with   • Leg Swelling   • Shortness of Breath       History of presenting illness:    This is a 72-year-old male with a past medical history of hypertension, TIA, Left-sided nontraumatic intracerebral hemorrhage back in 2018 ( left basal ganglia hemorrhage with 1 mm midline shift thought to be 2/2 uncontrolled HTN), CKD, IBS presenting with LLE swelling. Pt states that he is the only  for two households and spends several hours throughout the day sitting in his car. Roughly 3 weeks ago, he was diagnosed with Covid 19 but did not  require hospitalization. He now presents with LLE swelling that started on . Pt states that along with the leg swelling, he also developed a chest pain and shortness of breath that have now resolved. In ED, blood pressure 138/79, heart rate 92, respiratory rate of 12, temperature 99.1 and O2 saturation 96% on room air. Labs on arrival showed a sodium 139, potassium 4.1, BUN 22, creatinine 1.38, WBC 7.7, hemoglobin 13.30 platelets 145. CTA chest showed evidence for small to moderate bilateral pulmonary emboli.  Lower extremity duplex showed Acute left lower extremity deep vein thrombosis noted in the common femoral, proximal femoral, mid femoral, distal femoral, popliteal, posterial tibial, peroneal, gastrocnemius and soleal. At this time, he is complaining of LLE swelling and pain.     ---------------------------------------------------------------------------------------------------------------------   Review of Systems   Constitutional: Negative for chills, fatigue and fever.   HENT: Negative  for ear discharge.    Eyes: Negative for photophobia.   Respiratory: Negative for chest tightness and shortness of breath.    Cardiovascular: Positive for leg swelling. Negative for chest pain.   Gastrointestinal: Negative for diarrhea and nausea.   Endocrine: Negative for polyuria.   Genitourinary: Negative for dysuria.   Musculoskeletal: Negative for neck stiffness.   Psychiatric/Behavioral: Negative for confusion.     ---------------------------------------------------------------------------------------------------------------------   No past medical history on file.  No past surgical history on file.  No family history on file.  Social History     Socioeconomic History   • Marital status:      ---------------------------------------------------------------------------------------------------------------------   Allergies:  Patient has no allergy information on record.  ---------------------------------------------------------------------------------------------------------------------   Medications below are reported home medications pulling from within the system; at this time, these medications have not been reconciled unless otherwise specified and are in the verification process for further verifcation as current home medications.      Prior to Admission Medications     None        Objective     Vital Signs:  Temp:  [99.1 °F (37.3 °C)] 99.1 °F (37.3 °C)  Heart Rate:  [78-92] 78  Resp:  [12-24] 14  BP: (115-138)/(68-79) 133/74    Mean Arterial Pressure (Non-Invasive) for the past 24 hrs (Last 3 readings):   Noninvasive MAP (mmHg)   09/06/22 2130 91   09/06/22 2045 77   09/06/22 1930 90     SpO2:  [94 %-98 %] 98 %  on   ;      Body mass index is 30.54 kg/m².    Wt Readings from Last 3 Encounters:   09/06/22 88.5 kg (195 lb)   11/12/18 84.8 kg (187 lb)      ----------------------------------------------------------------------------------------------------------------------  PHYSICAL  EXAMINATION:  GENERAL: The patient is well developed and nontoxic.  HEENT: Nonicteric sclerae, PERRLA, EOMI. Oropharynx clear. Moist mucous membranes. Conjunctivae appear well perfused.  CHEST: Chest wall is nontender.  HEART: Regular rate and rhythm without murmurs.  LUNGS: Clear to auscultation bilaterally.  ABDOMEN: Soft, positive bowel sounds, nontender, no organomegaly.  RECTAL: Deferred.  SKIN: No rash, no excessive bruising, petechiae, or purpura.  NEUROLOGIC: Cranial nerves II-XII intact without motor/sensory deficit.    ---------------------------------------------------------------------------------------------------------------------  --------------------------------------------------------------------------------------------------------------------  LABS:    CBC and coagulation:  Results from last 7 days   Lab Units 09/06/22  1654   WBC 10*3/mm3 7.77   HEMOGLOBIN g/dL 13.3   HEMATOCRIT % 39.7   MCV fL 85.4   MCHC g/dL 33.5   PLATELETS 10*3/mm3 145     Acid/base balance:      Renal and electrolytes:  Results from last 7 days   Lab Units 09/06/22  1654   SODIUM mmol/L 139   POTASSIUM mmol/L 4.1   CHLORIDE mmol/L 105   CO2 mmol/L 25.1   BUN mg/dL 22   CREATININE mg/dL 1.38*   CALCIUM mg/dL 9.0   GLUCOSE mg/dL 117*     Estimated Creatinine Clearance: 52.9 mL/min (A) (by PASCALE formula based on SCr of 1.38 mg/dL (H)).    Liver and pancreatic function:  Results from last 7 days   Lab Units 09/06/22  1654   ALBUMIN g/dL 3.60   BILIRUBIN mg/dL 0.6   ALK PHOS U/L 64   AST (SGOT) U/L 14   ALT (SGPT) U/L 15     Endocrine function:  Lab Results   Component Value Date    HGBA1C 5.4 01/30/2018     Point of care bedside glucose levels:      Lab Results   Component Value Date    TSH 6.040 (H) 03/22/2019     Cardiac:  Results from last 7 days   Lab Units 09/06/22  1654   TROPONIN T ng/mL <0.010   PROBNP pg/mL 95.0       Cultures:  Lab Results   Component Value Date    CLARITYU CLEAR 02/18/2021    GLUCOSEU NEGATIVE  02/18/2021    KETONESU TRACE (A) 02/18/2021    BLOODU NEGATIVE 02/18/2021    NITRITEU NEGATIVE 02/18/2021    LEUKOCYTESUR NEGATIVE 02/18/2021    UROBILINOGEN 1.0 02/18/2021     Microbiology Results (last 10 days)     ** No results found for the last 240 hours. **          No results found for: PREGTESTUR, PREGSERUM, HCG, HCGQUANT  Pain Management Panel    There is no flowsheet data to display.         I have personally looked at the labs and they are summarized above.  ----------------------------------------------------------------------------------------------------------------------  Detailed radiology reports for the last 24 hours:    Imaging Results (Last 24 Hours)     Procedure Component Value Units Date/Time    CT Chest With Contrast Diagnostic [[id number]] Collected: 09/06/22 2054     Updated: 09/06/22 2057    Narrative:      PROCEDURE: CT CHEST W CONTRAST DIAGNOSTIC     COMPARISON:  [facility], CT, ABDOMEN/PELVIS WITH CONTRAST, 2/18/2021, 17:14.  INDICATIONS: Pulmonary embolism (PE) suspected, unknown D-dimer     TECHNIQUE: After obtaining the patient's consent, CT images were obtained with non-ionic   intravenous contrast material.       PROTOCOL:   Standard imaging protocol performed      RADIATION:       Automated exposure control was utilized to minimize radiation dose.      FINDINGS:  There are well-defined filling defects at the distal right pulmonary artery extending through the   bifurcation to involve the proximal segmental arteries of the right upper lobe, right middle lobe,   and right lower lobe.  Small filling defects are seen within the segmental to subsegmental arteries   of the left lower lobe and lingular pulmonary arteries.  Findings indicate the presence of small to   moderate bilateral pulmonary emboli.  There is no large or central embolus.       Mild to moderate breathing motion artifact is seen bilaterally. Scattered atelectasis is noted. No   well-defined consolidations  or significant pleural effusions are observed. No dominant pulmonary   nodules or abnormal pulmonary masses are seen.      No significant hilar, mediastinal, or axillary lymphadenopathy is seen. A normal aortic arch   branching pattern is noted.  Severe coronary artery calcifications are observed.  The thyroid gland   is unremarkable. The esophagus is unremarkable.     The limited evaluation of the upper abdomen demonstrates no definitive acute abnormalities.     No acute osseous abnormalities are seen.        Impression:         1. Evidence for small to moderate bilateral pulmonary emboli.  There is no large central or saddle   embolus.    2. No evidence for additional acute intrathoracic abnormality.  3. Evidence for severe coronary artery calcifications.         TOM DAVIES MD         Electronically Signed and Approved By: TOM DAVIES MD on 9/06/2022 at 20:54                     XR Chest 1 View [[id number]] Collected: 09/06/22 1755     Updated: 09/06/22 1758    Narrative:      PROCEDURE: XR CHEST 1 VW     COMPARISON: [facility], , CHEST AP/PA 1 VIEW, 2/18/2021, 15:50.     INDICATIONS: SHORTNESS OF BREATH, LOWER EXTREMITY SWELLING     FINDINGS:   No new consolidations or pleural effusions are observed. The cardiac silhouette and mediastinum are   unchanged. No definitive acute osseous abnormalities are seen on this single view.       Impression:         1. No change from the previous study with no evidence for acute cardiopulmonary process.                        TOM DAVIES MD         Electronically Signed and Approved By: TOM DAVIES MD on 9/06/2022 at 17:55                         Final impressions for the last 30 days of radiology reports:    CT Chest With Contrast Diagnostic    Result Date: 9/6/2022    1. Evidence for small to moderate bilateral pulmonary emboli.  There is no large central or saddle embolus.  2. No evidence for additional acute intrathoracic abnormality. 3. Evidence  for severe coronary artery calcifications.    TOM DAVIES MD       Electronically Signed and Approved By: TOM DAVIES MD on 9/06/2022 at 20:54             XR Chest 1 View    Result Date: 9/6/2022    1. No change from the previous study with no evidence for acute cardiopulmonary process.         TOM DAVIES MD       Electronically Signed and Approved By: TOM DAVIES MD on 9/06/2022 at 17:55                 Assessment & Plan       This is a 72-year-old male with a past medical history of hypertension, TIA, Left-sided nontraumatic intracerebral hemorrhage back in 2018 ( left basal ganglia hemorrhage with 1 mm midline shift thought to be 2/2 uncontrolled HTN), CKD, IBS presenting with LLE swelling.    Assessment   DVT  PE  HTN  CKD 3  IBS  TIA      Plan:  - Admit to inpatient  - Hep gtt  - F/u Trop, BNP and echo  - O2 sat monitor  - Consider consulting vascular      Alessandro Alexander MD  [facility] Hospitalist  09/06/22  21:51 EDT

## 2022-09-07 NOTE — CONSULTS
Pulmonary / Critical Care Consult Note      Patient Name: Jordi Gerard  : 1950  MRN: [id number]  Primary Care Physician:  Babak Lema MD  Referring Physician: Eri Gotti MD  Date of admission: 2022    Subjective   Subjective     Reason for Consult/ Chief Complaint:   DVT/PE    HPI:  Jordi Gerard is a 72 y.o. male, with no prior history of blood clots, with history of ICH back in 2018 came in for left lower extremity swelling.  Patient states he had COVID about 2 to 3 weeks ago and was bedbound for few days due to fatigue and not doing much.  He states over the last couple days has had increasing left lower extremity swelling, pain and tenderness.  He also reports that since last Friday he has had increasing chest pressure and shortness of breath.  Shortness of breath is worse with activity.  Denies any orthopnea.  Denies any coughing or hemoptysis.  In the emergency department he had low-grade temperature, clear chest x-ray and no evidence of hypoxemia.  However he did have increased work of breathing.  Duplex ultrasound showed extensive left lower extremity DVT and chest CT showed bilateral pulmonary emboli.  He has since been started on a heparin drip.  He feels his left lower extremity is less swollen and tender but is still causing issues.    Review of Systems  Constitutional symptoms:  Denied complaints   Ear, nose, throat: Denied complaints  Cardiovascular:   Chest tightness, otherwise denied complaints  Respiratory: Dyspnea, otherwise denied complaints  Gastrointestinal: Denied complaints  Musculoskeletal: Left lower extremity swelling and pain, otherwise denied complaints  Genitourinary: Denied complaints  Allergy / Immunology: Denied complaints  Hematologic: Denied complaints  Neurologic: Denied complaints  Skin: Denied complaints  Endocrine: Denied complaints  Psychiatric: Denied complaints      Personal History     Past Medical History:   Diagnosis Date   •  Stroke (HCC)        Past Surgical History:   Procedure Laterality Date   • FRACTURE SURGERY         Family History: No family history of blood clots.  No pulmonary comorbidities and primary for members    Social History:  reports that he has never smoked. He does not have any smokeless tobacco history on file. He reports that he does not drink alcohol and does not use drugs.    Home Medications:  diphenhydrAMINE    Allergies:  No Known Allergies    Objective    Objective     Vitals:   Temp:  [97.9 °F (36.6 °C)-99.1 °F (37.3 °C)] 98.1 °F (36.7 °C)  Heart Rate:  [71-92] 84  Resp:  [12-24] 17  BP: (115-140)/(63-79) 128/71  Flow (L/min):  [1-2] 1    Physical Exam:  Vital Signs Reviewed   General:  WDWN, Alert, NAD.    HEENT:  PERRL, EOMI.  OP, nares clear, no sinus tenderness  Neck:  Supple, no JVD, no thyromegaly  Lymph: no axillary, cervical, supraclavicular lymphadenopathy noted bilaterally  Chest:  good aeration, clear to auscultation bilaterally, tympanic to percussion bilaterally, no work of breathing noted  CV: RRR, no MGR, pulses 2+, equal.  Abd:  Soft, NT, ND, + BS, no HSM  EXT:  no clubbing, no cyanosis, no edema, no joint tenderness  Neuro:  A&Ox3, CN grossly intact, no focal deficits.  Skin: No rashes or lesions noted      Result Review    Result Review:  I have personally reviewed the results from the time of this admission to 9/7/2022 14:34 EDT and agree with these findings:  [x]  Laboratory  []  Microbiology  [x]  Radiology  [x]  EKG/Telemetry   [x]  Cardiology/Vascular   []  Pathology  []  Old records  []  Other:  Most notable findings include:   Chest CT with bilateral pulmonary emboli.  Extensive DVT noted in left lower extremity on ultrasound.  Troponin and NT BNP unremarkable        Lab 09/07/22  0501 09/06/22  2210 09/06/22  1654   WBC 7.17 7.49 7.77   HEMOGLOBIN 13.2 12.6* 13.3   HEMATOCRIT 38.4 38.8 39.7   PLATELETS 143 112* 145   SODIUM 134* 136 139   POTASSIUM 4.2 4.1 4.1   CHLORIDE 102 105 105    CO2 23.2 21.7* 25.1   BUN 17 21 22   CREATININE 1.36* 1.29* 1.38*   GLUCOSE 104* 89 117*   CALCIUM 8.7 8.8 9.0   TOTAL PROTEIN  --   --  6.9   ALBUMIN  --   --  3.60   GLOBULIN  --   --  3.3         Assessment & Plan   Assessment / Plan     Active Hospital Problems:  Active Hospital Problems    Diagnosis    • Multiple pulmonary emboli (HCC)      Impression:  Extensive left lower extremity DVT  Multiple bilateral PEs.  Likely provoked secondary to immobility  Recent COVID-19 infection  Increased work of breathing/dyspnea  CKD, stage III  Hyponatremia, clinically insignificant    Plan:  DVT and PE likely provoked secondary to recent COVID-19 infection and prolonged immobility  Continue heparin drip for now.  Can transition to Eliquis tomorrow and recommend 6 months of therapy for provoked DVT/bilateral PE  Monitor left lower extremity closely.  If worsening signs of clot/vascular congestion, then would recommend vascular consult  Wean O2 to keep SPO2 greater than 90%  Agree with echocardiogram    DVT prophylaxis:  Medical and mechanical DVT prophylaxis orders are present.     Code Status and Medical Interventions:   Ordered at: 09/06/22 2150     Code Status (Patient has no pulse and is not breathing):    CPR (Attempt to Resuscitate)     Medical Interventions (Patient has pulse or is breathing):    Full Support      Follow-up with us as an outpatient      Labs, imaging, microbiology, notes and medications personally reviewed  Discussed with primary    Thank you for involving me in the care of this patient.    Electronically signed by Booker Gomez MD, 09/07/22, 2:36 PM EDT.
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
## 2022-09-07 NOTE — CASE MANAGEMENT/SOCIAL WORK
Discharge Planning Assessment   Stanis[place]     Patient Name: Jordi Gerard  MRN: [id number]  Today's Date: 9/7/2022    Admit Date: 9/6/2022     Discharge Needs Assessment     Row Name 09/07/22 1327       Living Environment    People in Home spouse;grandchild(mayelin);child(mayelin), adult    Name(s) of People in Home LIVS WITH WIFE, GRANDCHILDREN THAT THEY HAVE CUSTODY OF AND ADULT STEP SON    Current Living Arrangements home    Primary Care Provided by self    Provides Primary Care For no one    Family Caregiver if Needed none    Able to Return to Prior Arrangements yes       Resource/Environmental Concerns    Resource/Environmental Concerns none    Transportation Concerns none  PATIENT DRIVES       Transition Planning    Patient/Family Anticipates Transition to home with family    Patient/Family Anticipated Services at Transition durable medical equipment    Transportation Anticipated family or friend will provide  DAUGHTER TO DRIVE HOME       Discharge Needs Assessment    Readmission Within the Last 30 Days no previous admission in last 30 days    Equipment Currently Used at Home bp cuff    Concerns to be Addressed discharge planning    Equipment Needed After Discharge oxygen    Current Discharge Risk lack of support system/caregiver               Discharge Plan     Row Name 09/07/22 3369       Plan    Plan CM assessment completed in room with patient.  CM role explained and discharge planning discussed. Demographics, PCP and Pharmacy verified and updated as appropriate. Patient is current with listed PCP.    Patient is independent, lives with wife, adult step son and grandchildren. Patient currently on O2 at 1L/NC. Patient does not wear home oxygen. AMPAC 20. PT has been ordered. Patient anticipates to discharge home with family.    Patient/Family in Agreement with Plan yes              Continued Care and Services - Admitted Since 9/6/2022    Coordination has not been started for this encounter.           Demographic Summary     Row Name 09/07/22 1326       General Information    Admission Type inpatient    Arrived From emergency department    Referral Source admission list    Reason for Consult discharge planning    Preferred Language English               Functional Status     Row Name 09/07/22 1326       Functional Status    Usual Activity Tolerance good    Current Activity Tolerance moderate       Functional Status, IADL    Medications independent    Meal Preparation independent    Housekeeping independent    Laundry independent    Shopping independent       Mental Status    General Appearance WDL WDL       Mental Status Summary    Recent Changes in Mental Status/Cognitive Functioning no changes       Employment/    Employment Status retired               Psychosocial    No documentation.                Abuse/Neglect    No documentation.                Legal    No documentation.                Substance Abuse    No documentation.                Patient Forms    No documentation.                   Odilia Stevenson

## 2022-09-07 NOTE — PLAN OF CARE
Goal Outcome Evaluation:      Pt on heparin gtt at 17 units. 2L NC. VSS. Lashanda Patterson RN

## 2022-09-08 LAB
ALBUMIN SERPL-MCNC: 3.3 G/DL (ref 3.5–5.2)
ALP SERPL-CCNC: 62 U/L (ref 39–117)
ALT SERPL W P-5'-P-CCNC: 13 U/L (ref 1–41)
ANION GAP SERPL CALCULATED.3IONS-SCNC: 9.1 MMOL/L (ref 5–15)
APTT PPP: 111.7 SECONDS (ref 24.2–34.2)
APTT PPP: 69.2 SECONDS (ref 24.2–34.2)
AST SERPL-CCNC: 12 U/L (ref 1–40)
BASOPHILS # BLD AUTO: 0.03 10*3/MM3 (ref 0–0.2)
BASOPHILS NFR BLD AUTO: 0.4 % (ref 0–1.5)
BILIRUB CONJ SERPL-MCNC: 0.2 MG/DL (ref 0–0.3)
BILIRUB INDIRECT SERPL-MCNC: 0.3 MG/DL
BILIRUB SERPL-MCNC: 0.5 MG/DL (ref 0–1.2)
BUN SERPL-MCNC: 19 MG/DL (ref 8–23)
BUN/CREAT SERPL: 15 (ref 7–25)
CALCIUM SPEC-SCNC: 9 MG/DL (ref 8.6–10.5)
CHLORIDE SERPL-SCNC: 102 MMOL/L (ref 98–107)
CO2 SERPL-SCNC: 22.9 MMOL/L (ref 22–29)
CREAT SERPL-MCNC: 1.27 MG/DL (ref 0.76–1.27)
DEPRECATED RDW RBC AUTO: 36.6 FL (ref 37–54)
EGFRCR SERPLBLD CKD-EPI 2021: 60.8 ML/MIN/1.73
EOSINOPHIL # BLD AUTO: 0.49 10*3/MM3 (ref 0–0.4)
EOSINOPHIL NFR BLD AUTO: 6.9 % (ref 0.3–6.2)
ERYTHROCYTE [DISTWIDTH] IN BLOOD BY AUTOMATED COUNT: 12 % (ref 12.3–15.4)
GLUCOSE SERPL-MCNC: 109 MG/DL (ref 65–99)
HCT VFR BLD AUTO: 36.7 % (ref 37.5–51)
HGB BLD-MCNC: 12.8 G/DL (ref 13–17.7)
IMM GRANULOCYTES # BLD AUTO: 0.04 10*3/MM3 (ref 0–0.05)
IMM GRANULOCYTES NFR BLD AUTO: 0.6 % (ref 0–0.5)
LYMPHOCYTES # BLD AUTO: 1.76 10*3/MM3 (ref 0.7–3.1)
LYMPHOCYTES NFR BLD AUTO: 24.7 % (ref 19.6–45.3)
MAGNESIUM SERPL-MCNC: 2.1 MG/DL (ref 1.6–2.4)
MCH RBC QN AUTO: 29.1 PG (ref 26.6–33)
MCHC RBC AUTO-ENTMCNC: 34.9 G/DL (ref 31.5–35.7)
MCV RBC AUTO: 83.4 FL (ref 79–97)
MONOCYTES # BLD AUTO: 0.61 10*3/MM3 (ref 0.1–0.9)
MONOCYTES NFR BLD AUTO: 8.6 % (ref 5–12)
NEUTROPHILS NFR BLD AUTO: 4.2 10*3/MM3 (ref 1.7–7)
NEUTROPHILS NFR BLD AUTO: 58.8 % (ref 42.7–76)
NRBC BLD AUTO-RTO: 0 /100 WBC (ref 0–0.2)
PHOSPHATE SERPL-MCNC: 3.3 MG/DL (ref 2.5–4.5)
PLATELET # BLD AUTO: 151 10*3/MM3 (ref 140–450)
PMV BLD AUTO: 8.9 FL (ref 6–12)
POTASSIUM SERPL-SCNC: 4 MMOL/L (ref 3.5–5.2)
PROT SERPL-MCNC: 6.5 G/DL (ref 6–8.5)
RBC # BLD AUTO: 4.4 10*6/MM3 (ref 4.14–5.8)
SODIUM SERPL-SCNC: 134 MMOL/L (ref 136–145)
WBC NRBC COR # BLD: 7.13 10*3/MM3 (ref 3.4–10.8)

## 2022-09-08 PROCEDURE — 84100 ASSAY OF PHOSPHORUS: CPT | Performed by: FAMILY MEDICINE

## 2022-09-08 PROCEDURE — 85730 THROMBOPLASTIN TIME PARTIAL: CPT | Performed by: FAMILY MEDICINE

## 2022-09-08 PROCEDURE — 94799 UNLISTED PULMONARY SVC/PX: CPT

## 2022-09-08 PROCEDURE — 25010000002 HEPARIN (PORCINE) 25000-0.45 UT/250ML-% SOLUTION: Performed by: EMERGENCY MEDICINE

## 2022-09-08 PROCEDURE — 99233 SBSQ HOSP IP/OBS HIGH 50: CPT | Performed by: FAMILY MEDICINE

## 2022-09-08 PROCEDURE — 83735 ASSAY OF MAGNESIUM: CPT | Performed by: FAMILY MEDICINE

## 2022-09-08 PROCEDURE — 80076 HEPATIC FUNCTION PANEL: CPT | Performed by: FAMILY MEDICINE

## 2022-09-08 PROCEDURE — 85025 COMPLETE CBC W/AUTO DIFF WBC: CPT | Performed by: FAMILY MEDICINE

## 2022-09-08 PROCEDURE — 99232 SBSQ HOSP IP/OBS MODERATE 35: CPT | Performed by: INTERNAL MEDICINE

## 2022-09-08 PROCEDURE — 63710000001 DIPHENHYDRAMINE PER 50 MG: Performed by: INTERNAL MEDICINE

## 2022-09-08 PROCEDURE — 80048 BASIC METABOLIC PNL TOTAL CA: CPT | Performed by: FAMILY MEDICINE

## 2022-09-08 PROCEDURE — 97161 PT EVAL LOW COMPLEX 20 MIN: CPT

## 2022-09-08 RX ORDER — DIPHENHYDRAMINE HCL 25 MG
25 CAPSULE ORAL EVERY 6 HOURS PRN
Status: DISCONTINUED | OUTPATIENT
Start: 2022-09-08 | End: 2022-09-09 | Stop reason: HOSPADM

## 2022-09-08 RX ORDER — ACETAMINOPHEN 325 MG/1
650 TABLET ORAL EVERY 4 HOURS PRN
Status: DISCONTINUED | OUTPATIENT
Start: 2022-09-08 | End: 2022-09-09 | Stop reason: HOSPADM

## 2022-09-08 RX ORDER — TRAMADOL HYDROCHLORIDE 50 MG/1
50 TABLET ORAL EVERY 6 HOURS PRN
Status: DISCONTINUED | OUTPATIENT
Start: 2022-09-08 | End: 2022-09-09 | Stop reason: HOSPADM

## 2022-09-08 RX ADMIN — APIXABAN 10 MG: 5 TABLET, FILM COATED ORAL at 20:25

## 2022-09-08 RX ADMIN — APIXABAN 10 MG: 5 TABLET, FILM COATED ORAL at 08:25

## 2022-09-08 RX ADMIN — DIPHENHYDRAMINE HYDROCHLORIDE 25 MG: 25 CAPSULE ORAL at 20:24

## 2022-09-08 RX ADMIN — Medication 10 ML: at 08:25

## 2022-09-08 RX ADMIN — DIPHENHYDRAMINE HYDROCHLORIDE 25 MG: 25 CAPSULE ORAL at 02:41

## 2022-09-08 RX ADMIN — ACETAMINOPHEN 650 MG: 325 TABLET ORAL at 02:42

## 2022-09-08 RX ADMIN — HEPARIN SODIUM 17 UNITS/KG/HR: 10000 INJECTION, SOLUTION INTRAVENOUS at 05:35

## 2022-09-08 RX ADMIN — Medication 10 ML: at 20:25

## 2022-09-08 NOTE — PLAN OF CARE
Goal Outcome Evaluation:  Plan of Care Reviewed With: patient        Progress: no change  Outcome Evaluation: Patient heparin gtt is therapeutic x2. Patient has complaints of LLE pain 2 out of 10. Medicated per mar, per patient request. Pain improved per patient. VSS. Call light within reach. No other complaints at this time.

## 2022-09-08 NOTE — PLAN OF CARE
Goal Outcome Evaluation:      Pt off heparin gtt, switched to eliquis. Pt rested throughout shift. VSS. Lashanda Patterson RN

## 2022-09-08 NOTE — PLAN OF CARE
Goal Outcome Evaluation:  Plan of Care Reviewed With: patient           Outcome Evaluation: Patient did not demonstrate any safety or mobility deficits during the initial evaluation.  He is safe to continue ambulating within his room with the supervision of the nursing staff until his discharge from the hospital.  He will be discharged from physical therapy services at this time

## 2022-09-08 NOTE — THERAPY EVALUATION
Acute Care - Physical Therapy Initial Evaluation   [place]     Patient Name: Jordi Gerard  : 1950  MRN: [id number]  Today's Date: 2022     Admit date: 2022     Referring Physician: Eri Gotti MD     Surgery Date:* No surgery found *               Visit Dx:     ICD-10-CM ICD-9-CM   1. Multiple pulmonary emboli (HCC)  I26.99 415.19   2. Acute deep vein thrombosis (DVT) of proximal vein of left lower extremity (HCC)  I82.4Y2 453.41   3. Difficulty in walking  R26.2 719.7     Patient Active Problem List   Diagnosis   • Multiple pulmonary emboli (HCC)     Past Medical History:   Diagnosis Date   • Stroke (HCC)      Past Surgical History:   Procedure Laterality Date   • FRACTURE SURGERY       PT Assessment (last 12 hours)     PT Evaluation and Treatment     Row Name 22 1000          Physical Therapy Time and Intention    Subjective Information no complaints  -DOMI     Document Type evaluation  -DOMI     Mode of Treatment individual therapy;physical therapy  -DOMI     Patient Effort good  -DOMI     Row Name 22 1000          General Information    Patient Observations alert;cooperative;agree to therapy  -DOMI     Prior Level of Function independent:;all household mobility;community mobility  -DOMI     Equipment Currently Used at Home none  -DOMI     Barriers to Rehab none identified  -DOMI     Row Name 22 1000          Range of Motion (ROM)    Range of Motion ROM is WFL  -DOMI     Row Name 22 1000          Strength (Manual Muscle Testing)    Strength (Manual Muscle Testing) strength is WFL  -DOMI     Row Name 22 1000          Bed Mobility    Bed Mobility bed mobility (all) activities;supine-sit  -DOMI     All Activities, [place] (Bed Mobility) independent  -DOMI     Supine-Sit [place] (Bed Mobility) independent  -DOMI     Row Name 22 1000          Transfers    Transfers bed-chair transfer;sit-stand transfer  -DOMI     Bed-Chair [place] (Transfers) supervision  -DOMI      Sit-Stand [place] (Transfers) supervision  -DOMI     Row Name 09/08/22 1000          Gait/Stairs (Locomotion)    Gait/Stairs Locomotion gait/ambulation independence  -DOMI     [place] Level (Gait) supervision  -DOMI     Distance in Feet (Gait) 70  -DOMI     Row Name 09/08/22 1000          Balance    Balance Assessment standing dynamic balance  -DOMI     Dynamic Standing Balance supervision  -DOMI     Row Name 09/08/22 1000          Plan of Care Review    Plan of Care Reviewed With patient  -DOMI     Outcome Evaluation Patient did not demonstrate any safety or mobility deficits during the initial evaluation.  He is safe to continue ambulating within his room with the supervision of the nursing staff until his discharge from the hospital.  He will be discharged from physical therapy services at this time  -DOMI     Row Name 09/08/22 1000          Therapy Assessment/Plan (PT)    Criteria for Skilled Interventions Met (PT) no problems identified which require skilled intervention  -DOMI     Therapy Frequency (PT) evaluation only  -DOMI     Row Name 09/08/22 1000          PT Evaluation Complexity    History, PT Evaluation Complexity no personal factors and/or comorbidities  -DOMI     Examination of Body Systems (PT Eval Complexity) total of 4 or more elements  -DOMI     Clinical Presentation (PT Evaluation Complexity) stable  -DOMI     Clinical Decision Making (PT Evaluation Complexity) low complexity  -DOMI     Overall Complexity (PT Evaluation Complexity) low complexity  -DOMI           User Key  (r) = Recorded By, (t) = Taken By, (c) = Cosigned By    Initials Name Provider Type    Louie Fleming, PT Physical Therapist                Physical Therapy Education                 Title: PT OT SLP Therapies (Done)     Topic: Physical Therapy (Done)     Point: Mobility training (Done)     Learning Progress Summary           Patient Acceptance, E,TB, VU by DOMI at 9/8/2022 1016                   Point: Precautions (Done)     Learning Progress  Summary           Patient Acceptance, E,TB, VU by DOMI at 9/8/2022 1016                               User Key     Initials Effective Dates Name Provider Type Discipline    DOMI 06/03/21 -  Louie Cortez PT Physical Therapist PT              PT Recommendation and Plan  Anticipated Discharge Disposition (PT): home  Therapy Frequency (PT): evaluation only  Plan of Care Reviewed With: patient  Outcome Evaluation: Patient did not demonstrate any safety or mobility deficits during the initial evaluation.  He is safe to continue ambulating within his room with the supervision of the nursing staff until his discharge from the hospital.  He will be discharged from physical therapy services at this time   Outcome Measures     Row Name 09/08/22 1000             How much help from another person do you currently need...    Turning from your back to your side while in flat bed without using bedrails? 4  -DOMI      Moving from lying on back to sitting on the side of a flat bed without bedrails? 4  -DOMI      Moving to and from a bed to a chair (including a wheelchair)? 4  -DOMI      Standing up from a chair using your arms (e.g., wheelchair, bedside chair)? 4  -DOMI      Climbing 3-5 steps with a railing? 3  -DOMI      To walk in hospital room? 3  -DOMI      AM-PAC 6 Clicks Score (PT) 22  -DOMI              Functional Assessment    Outcome Measure Options AM-PAC 6 Clicks Basic Mobility (PT)  -DOMI            User Key  (r) = Recorded By, (t) = Taken By, (c) = Cosigned By    Initials Name Provider Type    DOMI Louie Cortez PT Physical Therapist                 Time Calculation:    PT Charges     Row Name 09/08/22 1010             Time Calculation    PT Received On 09/08/22  -DOMI      PT Goal Re-Cert Due Date 09/17/22  -DOMI              Untimed Charges    PT Eval/Re-eval Minutes 35  -DOMI              Total Minutes    Untimed Charges Total Minutes 35  -DOMI       Total Minutes 35  -DOMI            User Key  (r) = Recorded By, (t) = Taken By, (c) =  Cosigned By    Initials Name Provider Type    DOMI Louie Cortez, PT Physical Therapist              Therapy Charges for Today     Code Description Service Date Service Provider Modifiers Qty    [id number] HC PT EVAL LOW COMPLEXITY 3 9/8/2022 Louie Cortez, PT GP 1          PT G-Codes  Outcome Measure Options: AM-PAC 6 Clicks Basic Mobility (PT)  AM-PAC 6 Clicks Score (PT): 22    Louie Cortez, ALENA  9/8/2022

## 2022-09-08 NOTE — PROGRESS NOTES
[facility]   Hospitalist Progress Note       Patient Name: Jordi MEJIA Jr [facility]  : 1950  MRN: [id number]  Primary Care Physician: Babak Lema MD  Date of admission: 2022  Today's Date: 2022  Room / Bed:   213/1  Subjective   Chief Complaint:  CP, SOA, LLE swelling / Acute DVT and acute bilat PE    Summary:   PT presents with LLE swelling that started on . Pt states that along with the leg swelling, he also developed a chest pain and shortness of breath that have now resolved. In ED, blood pressure 138/79, heart rate 92, respiratory rate of 12, temperature 99.1 and O2 saturation 96% on room air. Labs on arrival showed a sodium 139, potassium 4.1, BUN 22, creatinine 1.38, WBC 7.7, hemoglobin 13.30 platelets 145. CTA chest showed evidence for small to moderate bilateral pulmonary emboli.  Lower extremity duplex showed Acute left lower extremity deep vein thrombosis noted in the common femoral, proximal femoral, mid femoral, distal femoral, popliteal, posterial tibial, peroneal, gastrocnemius and soleal. At this time, he is complaining of LLE swelling and pain.     Interval Followup: 2022    LLE swelling better but still painful with walking  On room air with good sats  Normotensive  No further chest pains.  Pt feeling better overall.  Has concerns about not affording medicines.  Needs to be on anticoagulation for 6 months.   aware and working on it.      REVIEW OF SYSTEMS: All other systems reviewed and are negative.   GEN: No fevers. No chills. No weight gain. No weight loss.     HEENT:   No dysphagia/odynophagia. No visual disturbance.    GI:    No N/V.  No abd pain. No diarrhea. No constipation.  No bloody/black tarry stools. No hematemesis.   CV: + chest discomfort.  No palps. No lightheadedness. No syncope. No orthopnea/PND. + LLE edema.   RESP:    No cough. No wheeze.  No increased sputum. No hemoptysis. + ALLEN.  :   No dysuria or suprapubic discomfort. No  frequency.No urgency. No hesitancy. No incontinence. No hematuria. No flank pain.    MS:   No joint stiffness or arthralgias. No myalgias. No muscle weakness.    SKIN:   No painful or pruritic rashes.  No skin discoloration.  NEURO:  No focal numbness or weakness.  No headaches.  No ataxia. No slurred speech. No receptive/expressive aphasia.      PSYCH:   No anxiety. No depression.  ENDO:  No tremor, hair loss, heat or cold intolerance.  Objective   Temp:  [97.9 °F (36.6 °C)-99.8 °F (37.7 °C)] 98.1 °F (36.7 °C)  Heart Rate:  [69-86] 76  Resp:  [16-19] 16  BP: (115-130)/(61-73) 115/61  Flow (L/min):  [1] 1  PHYSICAL EXAM   CON: WN. WD. NAD. Alert.  On room air.  No work of breathing  EYES:  Sclera anicteric. EOMI. Normal conjunctiva.   ENT:  Oropharyngeal mucosa without ulcers or thrush.    NECK:  No thyromegaly. No stridor. Trachea midline.  RESP:  CTA. No wheezes. No crackles.  No work of breathing or tachypnea.   CV:  Rhythm regular. Rate WNL. No murmur noted.  LLE edema.  GI:  Soft and nontender. Nondistended.  Bowel sounds present.   EXT: Peripheral pulses intact.  No joint deformities or cyanosis.  LYMPH:  No lymphedema noted.  No cervical lymphadenopathy.  PSYCH:  Alert. Oriented. Normal affect and mood.  NEURO:  CNII-XII grossly intact. No dysarthria or aphasia. No unilateral weakness or paresthesia.  SKIN: No chronic venous stasis changes or varicosities.  No cellulitis    Results from last 7 days   Lab Units 09/08/22  0200 09/07/22  0501 09/06/22 2210 09/06/22  1654   WBC 10*3/mm3 7.13 7.17 7.49 7.77   HEMOGLOBIN g/dL 12.8* 13.2 12.6* 13.3   HEMATOCRIT % 36.7* 38.4 38.8 39.7   PLATELETS 10*3/mm3 151 143 112* 145     Results from last 7 days   Lab Units 09/08/22  0200 09/07/22  0501 09/06/22 2210 09/06/22  1654   SODIUM mmol/L 134* 134* 136 139   POTASSIUM mmol/L 4.0 4.2 4.1 4.1   CO2 mmol/L 22.9 23.2 21.7* 25.1   CHLORIDE mmol/L 102 102 105 105   ANION GAP mmol/L 9.1 8.8 9.3 8.9   BUN mg/dL 19 17 21 22    CREATININE mg/dL 1.27 1.36* 1.29* 1.38*   GLUCOSE mg/dL 109* 104* 89 117*     Results from last 7 days   Lab Units 09/06/22  2144   INR  1.12       RESULTS REVIEWED:  I have personally reviewed the results from the time of this admission to 9/8/2022 12:46 EDT and agree with these findings:  []  Laboratory  []  Microbiology  []  Radiology  []  EKG/Telemetry   []  Cardiology/Vascular   []  Pathology  []  Old records  []  Other:  Assessment / Plan   Assessment:  Acute bilateral pulmonary emboli  Chest pain and SOA due to the above  Acute LLE DVT  HTN  CKD3  IBS  Hx of TIA/CVA  Hx of left basal ganglia hemorrhage with 1 mm midline shift thought to be 2/2 uncontrolled HTN (2018)  Coronary arteriosclerosis noted on CT imaging     Plan:  Monitored bed  Up in chair.  Mobilize as tolerated.  Convert heparin over to Eliquis today.    Echo ... EF OK.  No RV strain noted.  Pulmonology consulted.  Appreciate recommendations.  Wean O2 to keep sats > 90%  Discussed with  / regarding anticoagulation Rx coverage  Discussed plan with RN.  DVT prophylaxis:  Medical and mechanical DVT prophylaxis orders are present.  CODE STATUS:      Code Status (Patient has no pulse and is not breathing): CPR (Attempt to Resuscitate)  Medical Interventions (Patient has pulse or is breathing): Full Support         Attending documentation:  I reviewed the above documentation and independently reviewed and rounded and evaluated the patient and discussed the care plan with STEVEN Spangler PA-C, I agree with his findings and plan as documented, what I have added to the care plan and modified is as follows in my documentation and my medical decision making; 72-year-old male hospitalized on 9/6/2022 with shortness of breath, acute hypoxemic respiratory failure, found to have extensive left lower extremity DVT, multiple bilateral PEs, likely provoked by immobility, started on supplemental oxygen, echo pending, on heparin continuous infusion.   Pulmonary consulted.  Transitioned off heparin to Eliquis, having issues with safe disposition planning as patient cannot afford Eliquis.  Working with patient's formulary to find alternate substitute. Interval follow-up: Patient seen and examined this morning, no acute distress, no acute major night events, weaned off oxygen, still has significant leg cramping of his left lower extremity which makes it difficult to ambulate safely, blood pressures have been stable, telemetry reviewed, no acute major arrhythmic events.  Denies chest pain or palpitations.  Review of systems obtained, all systems reviewed and negative except for generalized fatigue, left leg cramping vitals reviewed, labs reviewed.  On physical exam, well-appearing male in no acute distress, regular rate rhythm, clear auscultation bilaterally, soft nontender abdomen, left lower extremity slightly darker than the right lower extremity with trace edema.  Assessment as above, plan is to discontinue heparin continuous infusion with extensive PEs and DVT on the left side, start Eliquis 10 mg p.o. twice daily for 7 days then transition down to 5 mg p.o. twice daily thereafter for minimum 6 months duration, working with his insurance carrier to determine coverage that he can afford.  Telemetry monitoring, echocardiogram reviewed, consulted pulmonary discussed Dr. Gomez, recommendations appreciated, PT/OT, continue wean oxygen, a.m. labs, full code, DVT prophylax with Eliquis, clinical course and further management, discussed with nurse at the bedside.  More than 65% of the time of this patient's encounter was performed by me, this included face-to-face time, planning and coordinating, medical decision making and critical thinking personally done by me.  -CATALINA MORLEY       Electronically signed by Eri Gotti MD, 09/08/22, 6:47 PM EDT.  Portions of this documentation were transcribed electronically from a voice recognition software.  I confirm all data  accurately represents the service(s) I performed at today's visit.

## 2022-09-08 NOTE — PROGRESS NOTES
Pulmonary / Critical Care Progress Note      Patient Name: Jordi MEJIA Jr [facility]  : 1950  MRN: [id number]  Attending:  Eri Gotti MD  Date of admission: 2022    Subjective   Subjective   Follow-up for DVT and PE    Over past 24 hours:  On 1 L of oxygen  Walking around room with some dyspnea  Still complaining of left lower extremity pain and swelling.  A little bit better but still bothersome.  It is intermittent, worse with activity relieved with rest, nonradiating, 5 out of 10 in severity  Dyspnea improved  No coughing or wheezing  No chest pain or hemoptysis  No nausea, fevers or chills     Review of Systems  General: Denied complaints  Cardiovascular:  Denied complaints  Respiratory: Dyspnea, otherwise denied complaints  Gastrointestinal: Denied complaints  Musculoskeletal: Left lower extremity tenderness and swelling        Objective   Objective     Vitals:   Temp:  [97.9 °F (36.6 °C)-99.8 °F (37.7 °C)] 98.1 °F (36.7 °C)  Heart Rate:  [69-86] 76  Resp:  [16-19] 16  BP: (115-130)/(61-73) 115/61  Flow (L/min):  [1] 1    Physical Exam   Vital Signs Reviewed   General:  WDWN, Alert, NAD.    HEENT:  PERRL, EOMI.  OP, nares clear  Chest:  good aeration, clear to auscultation bilaterally, tympanic to percussion bilaterally, no work of breathing noted  CV: RRR, no MGR, pulses 2+, equal.  Abd:  Soft, NT, ND, + BS, no HSM  EXT:  no clubbing, no cyanosis, decreased left lower extremity tenderness and swelling  Neuro:  A&Ox3, CN grossly intact, no focal deficits.  Skin: No rashes or lesions noted      Result Review    Result Review:  I have personally reviewed the results from the time of this admission to 2022 13:40 EDT and agree with these findings:  [x]  Laboratory  [x]  Microbiology  [x]  Radiology  [x]  EKG/Telemetry   [x]  Cardiology/Vascular   []  Pathology  []  Old records  []  Other:  Most notable findings include:       Lab 22  0200 22  0501 22  2210 22  1654   WBC  7.13 7.17 7.49 7.77   HEMOGLOBIN 12.8* 13.2 12.6* 13.3   HEMATOCRIT 36.7* 38.4 38.8 39.7   PLATELETS 151 143 112* 145   SODIUM 134* 134* 136 139   POTASSIUM 4.0 4.2 4.1 4.1   CHLORIDE 102 102 105 105   CO2 22.9 23.2 21.7* 25.1   BUN 19 17 21 22   CREATININE 1.27 1.36* 1.29* 1.38*   GLUCOSE 109* 104* 89 117*   CALCIUM 9.0 8.7 8.8 9.0   PHOSPHORUS 3.3  --   --   --    TOTAL PROTEIN 6.5  --   --  6.9   ALBUMIN 3.30*  --   --  3.60   GLOBULIN  --   --   --  3.3     Echocardiogram with no evidence of right heart strain or elevated PA pressures      Assessment & Plan   Assessment / Plan     Active Hospital Problems:  Active Hospital Problems    Diagnosis    • Multiple pulmonary emboli (HCC)        Impression:  Extensive left lower extremity DVT  Multiple bilateral PEs.  Likely provoked secondary to immobility  Recent COVID-19 infection  Increased work of breathing/dyspnea  CKD, stage III  Hyponatremia, clinically insignificant     Plan:  DVT and PE likely provoked secondary to recent COVID-19 infection and prolonged immobility  Agree of changing heparin to Eliquis.  Recommend 6 months of therapy for provoked DVT/bilateral PE  Begin ambulating.  Pain control per primary  Wean O2 to keep SPO2 greater than 90%  Echocardiogram with no obvious abnormalities     DVT prophylaxis:  Medical and mechanical DVT prophylaxis orders are present.    CODE STATUS:   Code Status (Patient has no pulse and is not breathing): CPR (Attempt to Resuscitate)  Medical Interventions (Patient has pulse or is breathing): Full Support    Stable for discharge from my perspective.  Follow-up with us as an outpatient in 1 to 2 weeks      Labs, imaging, microbiology, notes and medications personally reviewed  Discussed with primary    Electronically signed by Booker Gomez MD, 09/08/22, 1:43 PM EDT.

## 2022-09-09 VITALS
SYSTOLIC BLOOD PRESSURE: 133 MMHG | BODY MASS INDEX: 30.61 KG/M2 | HEIGHT: 67 IN | OXYGEN SATURATION: 97 % | HEART RATE: 90 BPM | TEMPERATURE: 98.2 F | RESPIRATION RATE: 18 BRPM | DIASTOLIC BLOOD PRESSURE: 60 MMHG | WEIGHT: 195 LBS

## 2022-09-09 LAB
ALBUMIN SERPL-MCNC: 3.4 G/DL (ref 3.5–5.2)
ALP SERPL-CCNC: 65 U/L (ref 39–117)
ALT SERPL W P-5'-P-CCNC: 12 U/L (ref 1–41)
ANION GAP SERPL CALCULATED.3IONS-SCNC: 10.2 MMOL/L (ref 5–15)
AST SERPL-CCNC: 11 U/L (ref 1–40)
BASOPHILS # BLD AUTO: 0.04 10*3/MM3 (ref 0–0.2)
BASOPHILS NFR BLD AUTO: 0.6 % (ref 0–1.5)
BILIRUB CONJ SERPL-MCNC: 0.2 MG/DL (ref 0–0.3)
BILIRUB INDIRECT SERPL-MCNC: 0.3 MG/DL
BILIRUB SERPL-MCNC: 0.5 MG/DL (ref 0–1.2)
BUN SERPL-MCNC: 20 MG/DL (ref 8–23)
BUN/CREAT SERPL: 13.8 (ref 7–25)
CALCIUM SPEC-SCNC: 9.1 MG/DL (ref 8.6–10.5)
CHLORIDE SERPL-SCNC: 100 MMOL/L (ref 98–107)
CO2 SERPL-SCNC: 22.8 MMOL/L (ref 22–29)
CREAT SERPL-MCNC: 1.45 MG/DL (ref 0.76–1.27)
DEPRECATED RDW RBC AUTO: 36 FL (ref 37–54)
EGFRCR SERPLBLD CKD-EPI 2021: 51.8 ML/MIN/1.73
EOSINOPHIL # BLD AUTO: 0.56 10*3/MM3 (ref 0–0.4)
EOSINOPHIL NFR BLD AUTO: 8.8 % (ref 0.3–6.2)
ERYTHROCYTE [DISTWIDTH] IN BLOOD BY AUTOMATED COUNT: 11.9 % (ref 12.3–15.4)
GLUCOSE SERPL-MCNC: 101 MG/DL (ref 65–99)
HCT VFR BLD AUTO: 39.1 % (ref 37.5–51)
HGB BLD-MCNC: 13.2 G/DL (ref 13–17.7)
IMM GRANULOCYTES # BLD AUTO: 0.05 10*3/MM3 (ref 0–0.05)
IMM GRANULOCYTES NFR BLD AUTO: 0.8 % (ref 0–0.5)
LYMPHOCYTES # BLD AUTO: 1.57 10*3/MM3 (ref 0.7–3.1)
LYMPHOCYTES NFR BLD AUTO: 24.6 % (ref 19.6–45.3)
MAGNESIUM SERPL-MCNC: 2 MG/DL (ref 1.6–2.4)
MCH RBC QN AUTO: 28.5 PG (ref 26.6–33)
MCHC RBC AUTO-ENTMCNC: 33.8 G/DL (ref 31.5–35.7)
MCV RBC AUTO: 84.4 FL (ref 79–97)
MONOCYTES # BLD AUTO: 0.65 10*3/MM3 (ref 0.1–0.9)
MONOCYTES NFR BLD AUTO: 10.2 % (ref 5–12)
NEUTROPHILS NFR BLD AUTO: 3.52 10*3/MM3 (ref 1.7–7)
NEUTROPHILS NFR BLD AUTO: 55 % (ref 42.7–76)
NRBC BLD AUTO-RTO: 0 /100 WBC (ref 0–0.2)
PHOSPHATE SERPL-MCNC: 3.8 MG/DL (ref 2.5–4.5)
PLATELET # BLD AUTO: 181 10*3/MM3 (ref 140–450)
PMV BLD AUTO: 9 FL (ref 6–12)
POTASSIUM SERPL-SCNC: 3.8 MMOL/L (ref 3.5–5.2)
PROT SERPL-MCNC: 7 G/DL (ref 6–8.5)
RBC # BLD AUTO: 4.63 10*6/MM3 (ref 4.14–5.8)
SODIUM SERPL-SCNC: 133 MMOL/L (ref 136–145)
WBC NRBC COR # BLD: 6.39 10*3/MM3 (ref 3.4–10.8)

## 2022-09-09 PROCEDURE — 80076 HEPATIC FUNCTION PANEL: CPT | Performed by: FAMILY MEDICINE

## 2022-09-09 PROCEDURE — 99232 SBSQ HOSP IP/OBS MODERATE 35: CPT | Performed by: INTERNAL MEDICINE

## 2022-09-09 PROCEDURE — 80048 BASIC METABOLIC PNL TOTAL CA: CPT | Performed by: FAMILY MEDICINE

## 2022-09-09 PROCEDURE — 83735 ASSAY OF MAGNESIUM: CPT | Performed by: FAMILY MEDICINE

## 2022-09-09 PROCEDURE — 85025 COMPLETE CBC W/AUTO DIFF WBC: CPT | Performed by: FAMILY MEDICINE

## 2022-09-09 PROCEDURE — 84100 ASSAY OF PHOSPHORUS: CPT | Performed by: FAMILY MEDICINE

## 2022-09-09 PROCEDURE — 99239 HOSP IP/OBS DSCHRG MGMT >30: CPT | Performed by: FAMILY MEDICINE

## 2022-09-09 RX ADMIN — Medication 10 ML: at 08:05

## 2022-09-09 RX ADMIN — APIXABAN 10 MG: 5 TABLET, FILM COATED ORAL at 08:04

## 2022-09-09 NOTE — PLAN OF CARE
Problem: Adult Inpatient Plan of Care  Goal: Plan of Care Review  Outcome: Ongoing, Progressing  Goal: Patient-Specific Goal (Individualized)  Outcome: Ongoing, Progressing  Goal: Absence of Hospital-Acquired Illness or Injury  Outcome: Ongoing, Progressing  Intervention: Identify and Manage Fall Risk  Recent Flowsheet Documentation  Taken 9/8/2022 1947 by Dionne Neves RN  Safety Promotion/Fall Prevention: safety round/check completed  Intervention: Prevent and Manage VTE (Venous Thromboembolism) Risk  Recent Flowsheet Documentation  Taken 9/8/2022 1947 by Dionne Neves RN  Range of Motion: active ROM (range of motion) encouraged  Intervention: Prevent Infection  Recent Flowsheet Documentation  Taken 9/8/2022 1947 by Dionne Neves RN  Infection Prevention: cohorting utilized  Goal: Optimal Comfort and Wellbeing  Outcome: Ongoing, Progressing  Intervention: Provide Person-Centered Care  Recent Flowsheet Documentation  Taken 9/8/2022 1947 by Dionne Neves RN  Trust Relationship/Rapport: care explained  Goal: Readiness for Transition of Care  Outcome: Ongoing, Progressing     Problem: Fall Injury Risk  Goal: Absence of Fall and Fall-Related Injury  Outcome: Ongoing, Progressing  Intervention: Identify and Manage Contributors  Recent Flowsheet Documentation  Taken 9/8/2022 1947 by Dionne Neves RN  Medication Review/Management: medications reviewed  Intervention: Promote Injury-Free Environment  Recent Flowsheet Documentation  Taken 9/8/2022 1947 by Dionne Neves RN  Safety Promotion/Fall Prevention: safety round/check completed     Problem: Tissue Perfusion Altered  Goal: Improved Tissue Perfusion  Outcome: Ongoing, Progressing   Goal Outcome Evaluation:

## 2022-09-09 NOTE — PROGRESS NOTES
[facility]   Hospitalist Progress Note       Patient Name: Jordi MEJIA Jr [facility]  : 1950  MRN: [id number]  Primary Care Physician: Babak Lema MD  Date of admission: 2022  Today's Date: 2022  Room / Bed:   213/1  Subjective   Chief Complaint:  CP, SOA, LLE swelling / Acute DVT and acute bilat PE    Summary:   PT presents with LLE swelling that started on . Pt states that along with the leg swelling, he also developed a chest pain and shortness of breath that have now resolved. In ED, blood pressure 138/79, heart rate 92, respiratory rate of 12, temperature 99.1 and O2 saturation 96% on room air. Labs on arrival showed a sodium 139, potassium 4.1, BUN 22, creatinine 1.38, WBC 7.7, hemoglobin 13.30 platelets 145. CTA chest showed evidence for small to moderate bilateral pulmonary emboli.  Lower extremity duplex showed Acute left lower extremity deep vein thrombosis noted in the common femoral, proximal femoral, mid femoral, distal femoral, popliteal, posterial tibial, peroneal, gastrocnemius and soleal. At this time, he is complaining of LLE swelling and pain.     Interval Followup: 2022    LLE swelling better but still some pain in LLE but improved.  On room air with good sats  Normotensive  No RV strain noted on echo.  Eager to return home      REVIEW OF SYSTEMS: All other systems reviewed and are negative.   GEN: No fevers. No chills. No weight gain. No weight loss.     HEENT:   No dysphagia/odynophagia. No visual disturbance.    GI:    No N/V.  No abd pain. No diarrhea. No constipation.  No bloody/black tarry stools. No hematemesis.   CV: + chest discomfort.  No palps. No lightheadedness. No syncope. No orthopnea/PND. + LLE edema.   RESP:    No cough. No wheeze.  No increased sputum. No hemoptysis. + ALLEN.  :   No dysuria or suprapubic discomfort. No frequency.No urgency. No hesitancy. No incontinence. No hematuria. No flank pain.    MS:   No joint stiffness or arthralgias.  No myalgias. No muscle weakness.    SKIN:   No painful or pruritic rashes.  No skin discoloration.  NEURO:  No focal numbness or weakness.  No headaches.  No ataxia. No slurred speech. No receptive/expressive aphasia.      PSYCH:   No anxiety. No depression.  ENDO:  No tremor, hair loss, heat or cold intolerance.  Objective   Temp:  [98.1 °F (36.7 °C)-99.1 °F (37.3 °C)] 98.1 °F (36.7 °C)  Heart Rate:  [75-83] 83  Resp:  [16-19] 18  BP: ()/(61-78) 127/70  PHYSICAL EXAM   CON: WN. WD. NAD. Alert.  On room air.  No work of breathing  EYES:  Sclera anicteric. EOMI. Normal conjunctiva.   ENT:  Oropharyngeal mucosa without ulcers or thrush.    NECK:  No thyromegaly. No stridor. Trachea midline.  RESP:  CTA. No wheezes. No crackles.  No work of breathing or tachypnea.   CV:  Rhythm regular. Rate WNL. No murmur noted.  LLE edema.  GI:  Soft and nontender. Nondistended.  Bowel sounds present.   EXT: Peripheral pulses intact.  No joint deformities or cyanosis.  LYMPH:  No lymphedema noted.  No cervical lymphadenopathy.  PSYCH:  Alert. Oriented. Normal affect and mood.  NEURO:  CNII-XII grossly intact. No dysarthria or aphasia. No unilateral weakness or paresthesia.  SKIN: No chronic venous stasis changes or varicosities.  No cellulitis    Results from last 7 days   Lab Units 09/09/22  0408 09/08/22  0200 09/07/22  0501 09/06/22 2210 09/06/22  1654   WBC 10*3/mm3 6.39 7.13 7.17 7.49 7.77   HEMOGLOBIN g/dL 13.2 12.8* 13.2 12.6* 13.3   HEMATOCRIT % 39.1 36.7* 38.4 38.8 39.7   PLATELETS 10*3/mm3 181 151 143 112* 145     Results from last 7 days   Lab Units 09/09/22  0408 09/08/22  0200 09/07/22  0501 09/06/22 2210 09/06/22  1654   SODIUM mmol/L 133* 134* 134* 136 139   POTASSIUM mmol/L 3.8 4.0 4.2 4.1 4.1   CO2 mmol/L 22.8 22.9 23.2 21.7* 25.1   CHLORIDE mmol/L 100 102 102 105 105   ANION GAP mmol/L 10.2 9.1 8.8 9.3 8.9   BUN mg/dL 20 19 17 21 22   CREATININE mg/dL 1.45* 1.27 1.36* 1.29* 1.38*   GLUCOSE mg/dL 101* 109*  104* 89 117*     Results from last 7 days   Lab Units 09/06/22  2144   INR  1.12       RESULTS REVIEWED:  I have personally reviewed the results from the time of this admission to 9/9/2022 11:26 EDT and agree with these findings:  []  Laboratory  []  Microbiology  []  Radiology  []  EKG/Telemetry   []  Cardiology/Vascular   []  Pathology  []  Old records  []  Other:  Assessment / Plan   Assessment:  Acute bilateral pulmonary emboli  Chest pain and SOA due to the above, resolved  Acute LLE DVT (Likely provoked secondary to immobility and recent COVID-19 infection)  HTN  CKD3  IBS  Hx of TIA/CVA  Hx of left basal ganglia hemorrhage with 1 mm midline shift thought to be 2/2 uncontrolled HTN (2018)  Coronary arteriosclerosis noted on CT imaging     Plan:  Home today.  Discussed 6 months of AC with Eliquis.  Appreciate 's assistance with Rx coupon, Medicaid application, and patient care assistance form filled out.  Follow up with PCP and pulm in 1-2 weeks.  Echo ... EF OK.  No RV strain noted.  Pulmonology consulted.  Appreciate recommendations.  Discussed with  / regarding anticoagulation Rx coverage  Discussed plan with RN.  DVT prophylaxis:  Medical and mechanical DVT prophylaxis orders are present.  CODE STATUS:      Code Status (Patient has no pulse and is not breathing): CPR (Attempt to Resuscitate)  Medical Interventions (Patient has pulse or is breathing): Full Support           Attending documentation:  I reviewed the above documentation and independently reviewed and rounded and evaluated the patient and discussed the care plan with STEVEN Spangler PA-C, I agree with his findings and plan as documented, what I have added to the care plan and modified is as follows in my documentation and my medical decision making and discharge plan; 72-year-old male hospitalized on 9/6/2022 with shortness of breath, acute hypoxemic respiratory failure, found to have extensive left lower extremity DVT,  multiple bilateral PEs, likely provoked by immobility, started on supplemental oxygen, echo pending, on heparin continuous infusion.  Pulmonary consulted.  Transitioned off heparin to Eliquis, had issues with safe disposition planning as patient could not afford Eliquis.  Working with patient's formulary to find alternate substitute but ultimately prescribed Eliquis to be taken for minimum 6 months, worked with  to have drug coverage for patient.  Patient's breathing improved, he tolerated ambulation, left lower extremity improved.  Discharged in hemodynamically stable condition on 9/9/2022 to follow-up with pulmonary in 2 weeks.  Interval follow-up: Patient seen and examined on the day of discharge, no acute distress, no acute major night events, weaned off oxygen, improved leg cramping of his left lower extremity which he tolerated ambulation safely, blood pressures have been stable, telemetry reviewed, no acute major arrhythmic events.  Denies chest pain or palpitations.  Review of systems obtained, all systems reviewed and negative except for generalized fatigue, tolerable left leg cramping.  Vitals reviewed, labs reviewed.  On physical exam, well-appearing male in no acute distress, regular rate rhythm, clear auscultation bilaterally, soft nontender abdomen, left lower extremity slightly larger than the right lower extremity with trace edema.  Discharge plan reviewed with patient, nurse, social work.  Total discharge time 44 minutes.  More than 70% of the time of this patient's encounter was performed by me, this included face-to-face time, planning and coordinating, medical decision making and critical thinking personally done by me.  -CATALINA MORLEY     Electronically signed by Eri Gotti MD, 09/09/22, 6:47 PM EDT.  Portions of this documentation were transcribed electronically from a voice recognition software.  I confirm all data accurately represents the service(s) I performed at today's visit.

## 2022-09-09 NOTE — PROGRESS NOTES
Pulmonary / Critical Care Progress Note      Patient Name: Jordi MEJIA Jr [facility] [place]  : 1950  MRN: [id number]  Attending:  Eri Gotti MD  Date of admission: 2022    Subjective   Subjective   Follow-up for DVT and PE    Over past 24 hours:  On room air  Ambulating with minimal dyspnea  Has decreasing left lower extremity pain and swelling  No coughing or wheezing  No chest pain or hemoptysis  No nausea, fevers or chills     Review of Systems  General: Denied complaints  Cardiovascular:  Denied complaints  Respiratory: Dyspnea, otherwise denied complaints  Gastrointestinal: Denied complaints  Musculoskeletal: Left lower extremity tenderness and swelling        Objective   Objective     Vitals:   Temp:  [98.1 °F (36.7 °C)-99.1 °F (37.3 °C)] 98.2 °F (36.8 °C)  Heart Rate:  [75-90] 90  Resp:  [16-19] 18  BP: ()/(60-78) 133/60    Physical Exam   Vital Signs Reviewed   General:  WDWN, Alert, NAD.    HEENT:  PERRL, EOMI.  OP, nares clear  Chest:  good aeration, clear to auscultation bilaterally, tympanic to percussion bilaterally, no work of breathing noted  CV: RRR, no MGR, pulses 2+, equal.  Abd:  Soft, NT, ND, + BS, no HSM  EXT:  no clubbing, no cyanosis, decreased left lower extremity tenderness and swelling  Neuro:  A&Ox3, CN grossly intact, no focal deficits.  Skin: No rashes or lesions noted      Result Review    Result Review:  I have personally reviewed the results from the time of this admission to 2022 15:04 EDT and agree with these findings:  [x]  Laboratory  [x]  Microbiology  [x]  Radiology  [x]  EKG/Telemetry   [x]  Cardiology/Vascular   []  Pathology  []  Old records  []  Other:  Most notable findings include:       Lab 22  0408 22  0200 22  0501 22  2210 22  1654   WBC 6.39 7.13 7.17 7.49 7.77   HEMOGLOBIN 13.2 12.8* 13.2 12.6* 13.3   HEMATOCRIT 39.1 36.7* 38.4 38.8 39.7   PLATELETS 181 151 143 112* 145   SODIUM 133* 134* 134* 136 139   POTASSIUM 3.8  4.0 4.2 4.1 4.1   CHLORIDE 100 102 102 105 105   CO2 22.8 22.9 23.2 21.7* 25.1   BUN 20 19 17 21 22   CREATININE 1.45* 1.27 1.36* 1.29* 1.38*   GLUCOSE 101* 109* 104* 89 117*   CALCIUM 9.1 9.0 8.7 8.8 9.0   PHOSPHORUS 3.8 3.3  --   --   --    TOTAL PROTEIN 7.0 6.5  --   --  6.9   ALBUMIN 3.40* 3.30*  --   --  3.60   GLOBULIN  --   --   --   --  3.3     Echocardiogram with no evidence of right heart strain or elevated PA pressures      Assessment & Plan   Assessment / Plan     Active Hospital Problems:  Active Hospital Problems    Diagnosis    • Multiple pulmonary emboli (HCC)        Impression:  Extensive left lower extremity DVT  Multiple bilateral PEs.  Likely provoked secondary to immobility  Recent COVID-19 infection  Increased work of breathing/dyspnea  CKD, stage III  Hyponatremia, clinically insignificant     Plan:  DVT and PE likely provoked secondary to recent COVID-19 infection and prolonged immobility  Continue Eliquis.  Needs 6 months of therapy for provoked DVT/bilateral PE  Encourage activity  On room air     DVT prophylaxis:  Medical and mechanical DVT prophylaxis orders are present.    CODE STATUS:   Code Status (Patient has no pulse and is not breathing): CPR (Attempt to Resuscitate)  Medical Interventions (Patient has pulse or is breathing): Full Support    Stable for discharge from my perspective.  Follow-up with us as an outpatient in 1 to 2 weeks      Labs, imaging, microbiology, notes and medications personally reviewed  Discussed with primary    Electronically signed by Booker Gomez MD, 09/09/22, 3:04 PM EDT.

## 2022-09-09 NOTE — DISCHARGE SUMMARY
[facility]  HOSPITALIST  DISCHARGE SUMMARY       Patient Name: Jordi Gerard  : 1950  MRN: [id number]  Primary Care Physician: Babak Lema MD    Date of Admission: 2022  Date of Discharge: 2022    Discharge Diagnoses     Acute bilateral pulmonary emboli  Chest pain and SOA due to the above, resolved  Acute LLE DVT (Likely provoked secondary to immobility and recent COVID-19 infection)  HTN  CKD3  IBS  Hx of TIA/CVA  Hx of left basal ganglia hemorrhage with 1 mm midline shift thought to be 2/2 uncontrolled HTN (2018)  Coronary arteriosclerosis noted on CT imaging    Hospital Course   Hospital Course:  Jordi Gerard is a pleasant 72 y.o. male who presented with LLE swelling that started on 22. Pt states that along with the leg swelling, he also developed a chest pain and shortness of breath that have now resolved.  In ED, blood pressure 138/79, heart rate 92, respiratory rate of 12, temperature 99.1 and O2 saturation 96% on room air. Labs on arrival showed a sodium 139, potassium 4.1, BUN 22, creatinine 1.38, WBC 7.7, hemoglobin 13.30 platelets 145. Serial troponins were negative.    CTA chest showed evidence for small to moderate bilateral pulmonary emboli.  Lower extremity duplex showed acute left lower extremity deep vein thrombosis noted in the common femoral, proximal femoral, mid femoral, distal femoral, popliteal, posterial tibial, peroneal, gastrocnemius and soleal.     He was admitted to monitored bed and started on heparin drip.  He had an echo which did not show any right sided strain.  He remained hemodynamically stable.  Pulmonology consulted and the patient will follow up with them as an outpatient.  Heparin was changed to Eliquis (10 mg BID x 1 week, then 5 mg BID for the next 6 months.)  It was felt that his VTE was provoked by immobility and recent COVID infection.   helped with getting affordable options for 6 months of Eliquis.   He is now being discharged home.  Home health tba.  PCP follow up 1 week and pulmonology clinic follow up in 2 weeks.  Lastly, CT did show extensive coronary calcifications.  He would benefit from aggressive risk factor management, education, and monitoring for any cardiac symptoms.    Discharge Follow Up / Recommendations (labs, diagnostics, meds, etc):   As above  Future Appointments   Date Time Provider Department Center   9/27/2022 10:15 AM Beulah Huerta APRN [facility]     Consultants     Consults     Date and Time Order Name Status Description    9/7/2022  7:33 AM Inpatient Pulmonology Consult Completed     9/6/2022  9:10 PM Inpatient Hospitalist Consult          On Day of Discharge   VS: Temp:  [98.1 °F (36.7 °C)-98.8 °F (37.1 °C)] 98.2 °F (36.8 °C)  Heart Rate:  [75-90] 90  Resp:  [18-19] 18  BP: ()/(60-78) 133/60  EXAM:  (refer to progress note from 9/9/2022)     Discharge Medications      New Medications      Instructions Start Date   Eliquis 5 MG tablet tablet  Generic drug: apixaban   2 tablets 2 (Two) Times a Day for 7 days, THEN 1 tablet 2 (Two) Times a Day for 23 days.   Start Date: September 9, 2022     apixaban 5 MG tablet tablet  Commonly known as: ELIQUIS   5 mg, Oral, 2 Times Daily      apixaban 5 MG tablet tablet  Commonly known as: ELIQUIS   5 mg, Oral, 2 Times Daily         Continue These Medications      Instructions Start Date   diphenhydrAMINE 25 mg capsule  Commonly known as: BENADRYL   25 mg, Oral, Every 6 Hours PRN           Procedures     Imaging     Results for orders placed during the hospital encounter of 09/06/22    Duplex Venous Lower Extremity - Left CAR    Interpretation Summary  · Acute left lower extremity deep vein thrombosis noted in the common femoral, proximal femoral, mid femoral, distal femoral, popliteal, posterial tibial, peroneal, gastrocnemius and soleal.    Results for orders placed during the hospital encounter of 09/06/22    Adult Transthoracic  Echo Complete W/ Cont if Necessary Per Protocol    Interpretation Summary  Fibrocalcific mitral and aortic valves.  Normal left ventricular systolic function.  Trace MR and trace TR.    Adult Transthoracic Echo Complete W/ Cont if Necessary Per Protocol    Result Date: 9/7/2022  Fibrocalcific mitral and aortic valves. Normal left ventricular systolic function. Trace MR and trace TR.    CT Chest With Contrast Diagnostic    Result Date: 9/6/2022  PROCEDURE: CT CHEST W CONTRAST DIAGNOSTIC  COMPARISON:  [facility], CT, ABDOMEN/PELVIS WITH CONTRAST, 2/18/2021, 17:14. INDICATIONS: Pulmonary embolism (PE) suspected, unknown D-dimer  TECHNIQUE: After obtaining the patient's consent, CT images were obtained with non-ionic intravenous contrast material.   PROTOCOL:   Standard imaging protocol performed    RADIATION:      Automated exposure control was utilized to minimize radiation dose.  FINDINGS: There are well-defined filling defects at the distal right pulmonary artery extending through the bifurcation to involve the proximal segmental arteries of the right upper lobe, right middle lobe, and right lower lobe.  Small filling defects are seen within the segmental to subsegmental arteries of the left lower lobe and lingular pulmonary arteries.  Findings indicate the presence of small to moderate bilateral pulmonary emboli.  There is no large or central embolus.   Mild to moderate breathing motion artifact is seen bilaterally. Scattered atelectasis is noted. No well-defined consolidations or significant pleural effusions are observed. No dominant pulmonary nodules or abnormal pulmonary masses are seen.  No significant hilar, mediastinal, or axillary lymphadenopathy is seen. A normal aortic arch branching pattern is noted.  Severe coronary artery calcifications are observed.  The thyroid gland is unremarkable. The esophagus is unremarkable.  The limited evaluation of the upper abdomen demonstrates no definitive  acute abnormalities.  No acute osseous abnormalities are seen.         1. Evidence for small to moderate bilateral pulmonary emboli.  There is no large central or saddle embolus.  2. No evidence for additional acute intrathoracic abnormality. 3. Evidence for severe coronary artery calcifications.    TOM DAVIES MD       Electronically Signed and Approved By: TOM DAVIES MD on 9/06/2022 at 20:54             XR Chest 1 View    Result Date: 9/6/2022  PROCEDURE: XR CHEST 1 VW  COMPARISON: [facility], , CHEST AP/PA 1 VIEW, 2/18/2021, 15:50.  INDICATIONS: SHORTNESS OF BREATH, LOWER EXTREMITY SWELLING  FINDINGS:  No new consolidations or pleural effusions are observed. The cardiac silhouette and mediastinum are unchanged. No definitive acute osseous abnormalities are seen on this single view.        1. No change from the previous study with no evidence for acute cardiopulmonary process.         TOM DAVIES MD       Electronically Signed and Approved By: TOM DAVIES MD on 9/06/2022 at 17:55                 Discharge Details   Hospital Diet:     Diet Order   Procedures   • Diet Regular     CODE STATUS:    Code Status and Medical Interventions:   Ordered at: 09/06/22 2150     Code Status (Patient has no pulse and is not breathing):    CPR (Attempt to Resuscitate)     Medical Interventions (Patient has pulse or is breathing):    Full Support     Additional Instructions for the Follow-ups that You Need to Schedule     Discharge Follow-up with PCP   As directed       Currently Documented PCP:    Babak Lema MD    PCP Phone Number:    [phone]     Follow Up Details: 1 week PCP         Discharge Follow-up with Specified Provider: 2 weeks pulmonololgist clinic (Dr. Gomez's office); 2 Weeks   As directed      To: 2 weeks pulmonololgist clinic (Dr. Gomez's office)    Follow Up: 2 Weeks             Discharge Disposition: Home-Health Care Svc  Pertinent  Labs   LAB RESULTS:      Lab  09/09/22 0408 09/08/22  0946 09/08/22 0200 09/07/22  1829 09/07/22  1254 09/07/22 0501 09/06/22 2210 09/06/22 2144 09/06/22 2144 09/06/22  1654   WBC 6.39  --  7.13  --   --  7.17 7.49  --   --  7.77   HEMOGLOBIN 13.2  --  12.8*  --   --  13.2 12.6*  --   --  13.3   HEMATOCRIT 39.1  --  36.7*  --   --  38.4 38.8  --   --  39.7   PLATELETS 181  --  151  --   --  143 112*  --   --  145   NEUTROS ABS 3.52  --  4.20  --   --  3.87 4.73  --   --  5.43   IMMATURE GRANS (ABS) 0.05  --  0.04  --   --  0.05 0.04  --   --  0.05   LYMPHS ABS 1.57  --  1.76  --   --  2.13 1.64  --   --  1.21   MONOS ABS 0.65  --  0.61  --   --  0.60 0.60  --   --  0.61   EOS ABS 0.56*  --  0.49*  --   --  0.49* 0.43*  --   --  0.43*   MCV 84.4  --  83.4  --   --  85.3 90.4  --   --  85.4   PROTIME  --   --   --   --   --   --   --   --  14.6  --    APTT  --  69.2* 111.7* 97.9* 89.4* 117.0*  --    < > 27.5  --     < > = values in this interval not displayed.         Lab 09/09/22 0408 09/08/22 0200 09/07/22 0501 09/06/22 2210 09/06/22  1654   SODIUM 133* 134* 134* 136 139   POTASSIUM 3.8 4.0 4.2 4.1 4.1   CHLORIDE 100 102 102 105 105   CO2 22.8 22.9 23.2 21.7* 25.1   ANION GAP 10.2 9.1 8.8 9.3 8.9   BUN 20 19 17 21 22   CREATININE 1.45* 1.27 1.36* 1.29* 1.38*   EGFR 51.8* 60.8 56.0* 59.6* 55.0*   GLUCOSE 101* 109* 104* 89 117*   CALCIUM 9.1 9.0 8.7 8.8 9.0   MAGNESIUM 2.0 2.1  --   --   --    PHOSPHORUS 3.8 3.3  --   --   --          Lab 09/09/22  0408 09/08/22  0200 09/06/22  1654   TOTAL PROTEIN 7.0 6.5 6.9   ALBUMIN 3.40* 3.30* 3.60   GLOBULIN  --   --  3.3   ALT (SGPT) 12 13 15   AST (SGOT) 11 12 14   BILIRUBIN 0.5 0.5 0.6   INDIRECT BILIRUBIN 0.3 0.3  --    BILIRUBIN DIRECT 0.2 0.2  --    ALK PHOS 65 62 64         Lab 09/07/22  0059 09/06/22  2210 09/06/22  2144 09/06/22  1654   PROBNP  --   --   --  95.0   TROPONIN T <0.010 <0.010  --  <0.010   PROTIME  --   --  14.6  --    INR  --   --  1.12  --                  Brief Urine Lab  Results     None        Microbiology Results (last 10 days)     ** No results found for the last 240 hours. **        Labs Pending at Discharge:   Time spent on Discharge including face to face service: > 30 minutes  Electronically signed by IRAJ Antonio, 09/09/22, 4:58 PM EDT.           Attending documentation:  I reviewed the above documentation and independently reviewed and rounded and evaluated the patient and discussed the care plan with STEVEN Spangler PA-C, I agree with his findings and plan as documented, what I have added to the care plan and modified is as follows in my documentation and my medical decision making and discharge plan; 72-year-old male hospitalized on 9/6/2022 with shortness of breath, acute hypoxemic respiratory failure, found to have extensive left lower extremity DVT, multiple bilateral PEs, likely provoked by immobility, started on supplemental oxygen, echo pending, on heparin continuous infusion.  Pulmonary consulted.  Transitioned off heparin to Eliquis, had issues with safe disposition planning as patient could not afford Eliquis.  Working with patient's formulary to find alternate substitute but ultimately prescribed Eliquis to be taken for minimum 6 months, worked with  to have drug coverage for patient.  Patient's breathing improved, he tolerated ambulation, left lower extremity improved.  Discharged in hemodynamically stable condition on 9/9/2022 to follow-up with pulmonary in 2 weeks.  Interval follow-up: Patient seen and examined on the day of discharge, no acute distress, no acute major night events, weaned off oxygen, improved leg cramping of his left lower extremity which he tolerated ambulation safely, blood pressures have been stable, telemetry reviewed, no acute major arrhythmic events.  Denies chest pain or palpitations.  Review of systems obtained, all systems reviewed and negative except for generalized fatigue, tolerable left leg cramping.  Vitals reviewed,  labs reviewed.  On physical exam, well-appearing male in no acute distress, regular rate rhythm, clear auscultation bilaterally, soft nontender abdomen, left lower extremity slightly larger than the right lower extremity with trace edema.  Discharge plan reviewed with patient, nurse, social work.  Total discharge time 44 minutes.  More than 70% of the time of this patient's encounter was performed by me, this included face-to-face time, planning and coordinating, medical decision making and critical thinking personally done by me.  -CATALINA MORLEY       Electronically signed by Eri Gotti MD, 09/09/22, 6:47 PM EDT.  Portions of this documentation were transcribed electronically from a voice recognition software.  I confirm all data accurately represents the service(s) I performed at today's visit.

## 2022-09-22 NOTE — PROGRESS NOTES
Primary Care Provider  Babak Lema MD     Referring Provider  No ref. provider found     Chief Complaint  Mutiple Bilateral PE's and Follow-up (Hospital follow up)    Subjective          History of Presenting Illness  Patient is a 72-year-old male who was recently hospitalized at [facility] from 9/6/2022 to 9/9/2022 for acute bilateral pulmonary emboli and acute left lower extremity DVT who presents for a follow-up visit today.  Per discharge summary report, patient presented with left lower extremity swelling that started 9/2/2022.  Patient states that along with leg swelling he also developed chest pain and shortness of breath that have resolved while in the emergency room.  In the emergency room, blood pressure 138/79, heart rate 92, respiratory rate of 12, temperature 99.1 and O2 saturation 96% on room air. Labs on arrival showed a sodium 139, potassium 4.1, BUN 22, creatinine 1.38, WBC 7.7, hemoglobin 13.30 platelets 145. Serial troponins were negative.  Patient had a CTA of the chest completed which showed evidence for small to moderate bilateral pulmonary emboli.  Lower extremity duplex showed acute left lower extremity DVT noted in the common femoral, proximal femoral, mid femoral, distal femoral, popliteal, posterior tibial, peroneal, gastrocnemius and soleal.  Patient was admitted to monitored bed and started on heparin drip.  Patient had echocardiogram completed which did not show any right sided heart strain.  Pulmonology was consulted and patient was advised to follow-up with pulmonary as an outpatient.  Heparin was changed to Eliquis.  It was felt that his VTE was provoked by immobility and recent COVID infection per discharge from report.   help with getting affordable options for 6 months of Eliquis.  Patient was discharged home.  Patient states that since hospital stay he is feeling better.  Patient states that he is taking Eliquis, however did admit to missing 2  doses of Eliquis since he has been discharged from the hospital.  Patient states that his left leg pain has improved since hospital stay.  Patient states that he is scheduled to see cardiologist, Dr. Machado.  Patient denies fever, chills, night sweats, swollen glands in the head and neck, unintentional weight loss, hemoptysis, purulent sputum production, dysphagia, chest pain, palpitations, chest tightness, abdominal pain, nausea, vomiting, and diarrhea.  Patient also denies any myalgias, changes in sense of taste and/or smell, sore throat, any other coronavirus or flu-like symptoms.  Patient denies any leg swelling, orthopnea, paroxysmal nocturnal dyspnea.  Patient also denies any hematuria, hematochezia, hematemesis, and epistaxis.  Patient is able to perform activities of daily living.        Review of Systems   Constitutional: Negative for activity change, appetite change, chills, diaphoresis, fatigue, fever, unexpected weight gain and unexpected weight loss.        Negative for Insomnia   HENT: Negative for congestion (Nasal), mouth sores, nosebleeds, postnasal drip, sore throat, swollen glands and trouble swallowing.         Negative for Thrush  Negative for Hoarseness  Negative for Allergies/Hay Fever  Negative for Recent Head injury  Negative for Ear Fullness  Negative for Nasal or Sinus pain  Negative for Dry lips  Negative for Nasal discharge   Respiratory: Positive for shortness of breath (Improved since hospital stay per patient report). Negative for apnea, cough, chest tightness and wheezing.         Negative for Hemoptysis  Negative for Pleuritic pain   Cardiovascular: Negative for chest pain, palpitations and leg swelling.        Negative for Claudication  Negative for Cyanosis  Negative for Dyspnea on exertion   Gastrointestinal: Negative for abdominal pain, diarrhea, nausea, vomiting and GERD.   Musculoskeletal: Negative for joint swelling and myalgias.        Negative for Joint pain  Negative  for Joint stiffness  Left leg pain: Improved since hospital stay per patient report   Skin: Negative for color change, dry skin, pallor and rash.   Neurological: Negative for syncope, weakness and headache.   Hematological: Negative for adenopathy. Does not bruise/bleed easily.        Family History   Problem Relation Age of Onset   • Heart failure Mother    • Heart failure Father    • Heart failure Sister    • Hypertension Sister    • Heart failure Brother    • Hypertension Brother         Social History     Socioeconomic History   • Marital status:    Tobacco Use   • Smoking status: Former Smoker     Years: 0.50     Types: Cigarettes   • Smokeless tobacco: Never Used   • Tobacco comment: Pt stated he smoked when he was 19 yrs old for 6 months/ 2-3 cigs per day   Vaping Use   • Vaping Use: Never used   Substance and Sexual Activity   • Alcohol use: Never   • Drug use: Never   • Sexual activity: Defer        Past Medical History:   Diagnosis Date   • Pulmonary embolism (HCC)    • Stroke (HCC)           There is no immunization history on file for this patient.    No Known Allergies       Current Outpatient Medications:   •  apixaban (ELIQUIS) 5 MG tablet tablet, 2 tablets 2 (Two) Times a Day for 7 days, THEN 1 tablet 2 (Two) Times a Day for 23 days., Disp: 74 tablet, Rfl: 0  •  diphenhydrAMINE (BENADRYL) 25 mg capsule, Take 25 mg by mouth Every 6 (Six) Hours As Needed for Itching or Allergies., Disp: , Rfl:      Objective     Physical Exam  Vital Signs:   WDWN, Alert, NAD.    HEENT:  PERRL, EOMI.  OP, nares clear, no sinus tenderness  Neck:  Supple, no JVD, no thyromegaly.  Lymph: no axillary, cervical, supraclavicular lymphadenopathy noted bilaterally  Chest:  good aeration, clear to auscultation bilaterally, tympanic to percussion bilaterally, no work of breathing noted  CV: RRR, no MGR, pulses 2+, equal.  Abd:  Soft, NT, ND, + BS, no HSM  EXT:  no clubbing, no cyanosis, no edema, no joint  "tenderness  Neuro:  A&Ox3, CN grossly intact, no focal deficits.  Skin: No rashes or lesions noted.    /74 (BP Location: Left arm, Patient Position: Sitting, Cuff Size: Adult)   Pulse 74   Temp 98.2 °F (36.8 °C) (Tympanic)   Resp 16   Ht 170.2 cm (67\")   Wt 88.8 kg (195 lb 12.8 oz)   SpO2 99% Comment: room air  BMI 30.67 kg/m²         Result Review :   I have reviewed discharge summary and imaging study reports from recent hospital stay.  See scanned reports.    Procedures:         Assessment and Plan      Assessment:  1.  Multiple bilateral PEs.  Likely provoked secondary to immobility.  2.  Extensive left lower extremity DVT.  Patient will need to 6 months of therapy for provoked DVT/bilateral PE.  3.  Recent COVID-19 infection.  4.  Dyspnea.  5.  Chronic kidney disease, stage III.  6.  Left leg pain: Improved since hospital stay per patient report.  7.  Tobacco abuse of cigarettes in remission.  Patient states that he only smoked when he was 19 years old for 6 months and only smoked 2 to 3 cigarettes/day.  Not eligible for lung cancer screening.  8.  Coronary arteriosclerosis noted on CT imaging.  Patient states that he is scheduled to see cardiologist, Dr. Machado.        Plan:  1.  Patient admits to missing 2 doses of Eliquis since being discharged in the hospital.  Patient is advised take Eliquis every day as prescribed and to not miss any doses.  Medication compliance discussed with patient in the office today.  Risks of not taking medications as prescribed discussed with the patient.  Patient verbalized understanding and compliance. Patient is advised to take all medications as prescribed.  2.  Will repeat chest CT scan and duplex venous lower extremity study again in 3 months to ensure resolution of pulmonary embolism and DVT.  Orders placed today.  3.  Continue Eliquis as prescribed.  4.  Vaccination status:  patient declines flu, pneumonia, and COVID-19 vaccinations.  Discussed with " patient the benefits of vaccination including decreased risk of severe illness, hospitalization and death related to flu, pneumonia, and COVID-19.  Patient verbalized understanding and will consider getting vaccinated.  Patient is advised to continue to follow CDC recommendations such as social distancing, wearing a mask, and washing hands for least 20 seconds.  5.  Smoking status: Patient is a former cigarette smoker.  Not eligible for lung cancer screening.  6.  Patient to call the office, 911, or go to the ER with new or worsening symptoms.  7.  Follow-up in 3 to 4 months, sooner if needed.              Follow Up   Return for 3-4 months with Dr. Gomez.  Patient was given instructions and counseling regarding his condition or for health maintenance advice. Please see specific information pulled into the AVS if appropriate.

## 2022-09-28 ENCOUNTER — OFFICE VISIT (OUTPATIENT)
Dept: PULMONOLOGY | Facility: CLINIC | Age: 72
End: 2022-09-28

## 2022-09-28 VITALS
TEMPERATURE: 98.2 F | WEIGHT: 195.8 LBS | RESPIRATION RATE: 16 BRPM | OXYGEN SATURATION: 99 % | DIASTOLIC BLOOD PRESSURE: 74 MMHG | HEIGHT: 67 IN | BODY MASS INDEX: 30.73 KG/M2 | SYSTOLIC BLOOD PRESSURE: 122 MMHG | HEART RATE: 74 BPM

## 2022-09-28 DIAGNOSIS — N18.30 STAGE 3 CHRONIC KIDNEY DISEASE, UNSPECIFIED WHETHER STAGE 3A OR 3B CKD: ICD-10-CM

## 2022-09-28 DIAGNOSIS — M79.605 LEFT LEG PAIN: ICD-10-CM

## 2022-09-28 DIAGNOSIS — I26.99 MULTIPLE PULMONARY EMBOLI: Primary | ICD-10-CM

## 2022-09-28 DIAGNOSIS — Z86.16 HISTORY OF COVID-19: ICD-10-CM

## 2022-09-28 DIAGNOSIS — I82.462 ACUTE DEEP VEIN THROMBOSIS (DVT) OF CALF MUSCLE VEIN OF LEFT LOWER EXTREMITY: ICD-10-CM

## 2022-09-28 PROCEDURE — 1111F DSCHRG MED/CURRENT MED MERGE: CPT | Performed by: NURSE PRACTITIONER

## 2022-09-28 PROCEDURE — 99214 OFFICE O/P EST MOD 30 MIN: CPT | Performed by: NURSE PRACTITIONER

## 2022-09-28 RX ORDER — PREDNISONE 20 MG/1
TABLET ORAL
COMMUNITY
Start: 2022-08-17 | End: 2022-09-28

## 2022-12-06 ENCOUNTER — HOSPITAL ENCOUNTER (OUTPATIENT)
Dept: CARDIOLOGY | Facility: HOSPITAL | Age: 72
Discharge: HOME | End: 2022-12-06

## 2022-12-06 ENCOUNTER — HOSPITAL ENCOUNTER (OUTPATIENT)
Dept: CT IMAGING | Facility: HOSPITAL | Age: 72
Discharge: HOME | End: 2022-12-06

## 2022-12-06 DIAGNOSIS — I82.462 ACUTE DEEP VEIN THROMBOSIS (DVT) OF CALF MUSCLE VEIN OF LEFT LOWER EXTREMITY: ICD-10-CM

## 2022-12-06 DIAGNOSIS — Z86.16 HISTORY OF COVID-19: ICD-10-CM

## 2022-12-06 DIAGNOSIS — M79.605 LEFT LEG PAIN: ICD-10-CM

## 2022-12-06 DIAGNOSIS — I82.402 RECURRENT ACUTE DEEP VEIN THROMBOSIS (DVT) OF LEFT LOWER EXTREMITY: Primary | ICD-10-CM

## 2022-12-06 DIAGNOSIS — N18.30 STAGE 3 CHRONIC KIDNEY DISEASE, UNSPECIFIED WHETHER STAGE 3A OR 3B CKD: ICD-10-CM

## 2022-12-06 DIAGNOSIS — I26.99 MULTIPLE PULMONARY EMBOLI: ICD-10-CM

## 2022-12-06 LAB
BH CV LOW VAS LEFT DISTAL FEMORAL SPONT: 1
BH CV LOW VAS LEFT MID FEMORAL SPONT: 1
BH CV LOW VAS LEFT PERONEAL VESSEL: 1
BH CV LOW VAS LEFT POPLITEAL SPONT: 1
BH CV LOW VAS LEFT PROXIMAL FEMORAL SPONT: 1
BH CV LOWER VASCULAR LEFT COMMON FEMORAL AUGMENT: NORMAL
BH CV LOWER VASCULAR LEFT COMMON FEMORAL COMPETENT: NORMAL
BH CV LOWER VASCULAR LEFT COMMON FEMORAL COMPRESS: NORMAL
BH CV LOWER VASCULAR LEFT COMMON FEMORAL PHASIC: NORMAL
BH CV LOWER VASCULAR LEFT COMMON FEMORAL SPONT: NORMAL
BH CV LOWER VASCULAR LEFT DISTAL FEMORAL AUGMENT: NORMAL
BH CV LOWER VASCULAR LEFT DISTAL FEMORAL COMPETENT: NORMAL
BH CV LOWER VASCULAR LEFT DISTAL FEMORAL COMPRESS: NORMAL
BH CV LOWER VASCULAR LEFT DISTAL FEMORAL PHASIC: NORMAL
BH CV LOWER VASCULAR LEFT DISTAL FEMORAL SPONT: NORMAL
BH CV LOWER VASCULAR LEFT DISTAL FEMORAL THROMBUS: NORMAL
BH CV LOWER VASCULAR LEFT GASTRONEMIUS COMPRESS: NORMAL
BH CV LOWER VASCULAR LEFT GREATER SAPH AK COMPRESS: NORMAL
BH CV LOWER VASCULAR LEFT GREATER SAPH BK COMPRESS: NORMAL
BH CV LOWER VASCULAR LEFT LESSER SAPH COMPRESS: NORMAL
BH CV LOWER VASCULAR LEFT MID FEMORAL AUGMENT: NORMAL
BH CV LOWER VASCULAR LEFT MID FEMORAL COMPETENT: NORMAL
BH CV LOWER VASCULAR LEFT MID FEMORAL COMPRESS: NORMAL
BH CV LOWER VASCULAR LEFT MID FEMORAL PHASIC: NORMAL
BH CV LOWER VASCULAR LEFT MID FEMORAL SPONT: NORMAL
BH CV LOWER VASCULAR LEFT MID FEMORAL THROMBUS: NORMAL
BH CV LOWER VASCULAR LEFT PERONEAL COMPRESS: NORMAL
BH CV LOWER VASCULAR LEFT PERONEAL THROMBUS: NORMAL
BH CV LOWER VASCULAR LEFT POPLITEAL AUGMENT: NORMAL
BH CV LOWER VASCULAR LEFT POPLITEAL COMPETENT: NORMAL
BH CV LOWER VASCULAR LEFT POPLITEAL COMPRESS: NORMAL
BH CV LOWER VASCULAR LEFT POPLITEAL PHASIC: NORMAL
BH CV LOWER VASCULAR LEFT POPLITEAL SPONT: NORMAL
BH CV LOWER VASCULAR LEFT POPLITEAL THROMBUS: NORMAL
BH CV LOWER VASCULAR LEFT POSTERIOR TIBIAL COMPRESS: NORMAL
BH CV LOWER VASCULAR LEFT PROXIMAL FEMORAL AUGMENT: NORMAL
BH CV LOWER VASCULAR LEFT PROXIMAL FEMORAL COMPETENT: NORMAL
BH CV LOWER VASCULAR LEFT PROXIMAL FEMORAL COMPRESS: NORMAL
BH CV LOWER VASCULAR LEFT PROXIMAL FEMORAL PHASIC: NORMAL
BH CV LOWER VASCULAR LEFT PROXIMAL FEMORAL SPONT: NORMAL
BH CV LOWER VASCULAR LEFT PROXIMAL FEMORAL THROMBUS: NORMAL
BH CV LOWER VASCULAR LEFT SAPHENOFEMORAL JUNCTION COMPRESS: NORMAL
BH CV LOWER VASCULAR RIGHT COMMON FEMORAL AUGMENT: NORMAL
BH CV LOWER VASCULAR RIGHT COMMON FEMORAL COMPETENT: NORMAL
BH CV LOWER VASCULAR RIGHT COMMON FEMORAL COMPRESS: NORMAL
BH CV LOWER VASCULAR RIGHT COMMON FEMORAL PHASIC: NORMAL
BH CV LOWER VASCULAR RIGHT COMMON FEMORAL SPONT: NORMAL
CREAT BLDA-MCNC: 1.9 MG/DL
EGFRCR SERPLBLD CKD-EPI 2021: 37.5 ML/MIN/1.73
MAXIMAL PREDICTED HEART RATE: 150 BPM
STRESS TARGET HR: 128 BPM

## 2022-12-06 PROCEDURE — 82565 ASSAY OF CREATININE: CPT

## 2022-12-06 PROCEDURE — 0 IOPAMIDOL PER 1 ML: Performed by: NURSE PRACTITIONER

## 2022-12-06 PROCEDURE — 71260 CT THORAX DX C+: CPT

## 2022-12-06 PROCEDURE — 93971 EXTREMITY STUDY: CPT | Performed by: SURGERY

## 2022-12-06 PROCEDURE — 93971 EXTREMITY STUDY: CPT

## 2022-12-06 RX ADMIN — IOPAMIDOL 65 ML: 755 INJECTION, SOLUTION INTRAVENOUS at 12:38

## 2022-12-16 ENCOUNTER — CONSULT (OUTPATIENT)
Dept: ONCOLOGY | Facility: HOSPITAL | Age: 72
End: 2022-12-16

## 2022-12-16 VITALS
HEART RATE: 83 BPM | SYSTOLIC BLOOD PRESSURE: 126 MMHG | DIASTOLIC BLOOD PRESSURE: 66 MMHG | RESPIRATION RATE: 18 BRPM | HEIGHT: 67 IN | WEIGHT: 198.85 LBS | OXYGEN SATURATION: 100 % | BODY MASS INDEX: 31.21 KG/M2 | TEMPERATURE: 97.9 F

## 2022-12-16 DIAGNOSIS — I82.402 RECURRENT ACUTE DEEP VEIN THROMBOSIS (DVT) OF LEFT LOWER EXTREMITY: ICD-10-CM

## 2022-12-16 DIAGNOSIS — Z12.5 SCREENING FOR PROSTATE CANCER: Primary | ICD-10-CM

## 2022-12-16 PROBLEM — G47.00 INSOMNIA: Status: ACTIVE | Noted: 2019-09-26

## 2022-12-16 PROBLEM — I26.99 PULMONARY EMBOLISM WITH INFARCTION: Status: ACTIVE | Noted: 2022-09-16

## 2022-12-16 PROBLEM — Z11.59 ENCOUNTER FOR SCREENING FOR OTHER VIRAL DISEASES: Status: ACTIVE | Noted: 2017-11-22

## 2022-12-16 PROBLEM — I25.10 CORONARY ARTERIOSCLEROSIS: Status: ACTIVE | Noted: 2022-09-16

## 2022-12-16 PROBLEM — IMO0001 TRANSITION OF PATIENT BETWEEN CARE SETTINGS: Status: ACTIVE | Noted: 2021-03-01

## 2022-12-16 PROBLEM — R07.89 ATYPICAL CHEST PAIN: Status: ACTIVE | Noted: 2021-07-07

## 2022-12-16 PROBLEM — R20.2 NUMBNESS AND TINGLING OF RIGHT ARM: Status: ACTIVE | Noted: 2019-03-21

## 2022-12-16 PROBLEM — R22.42 LOCALIZED SWELLING OF LEFT LOWER LEG: Status: ACTIVE | Noted: 2022-11-02

## 2022-12-16 PROBLEM — R29.898 WEAKNESS OF RIGHT UPPER EXTREMITY: Status: ACTIVE | Noted: 2019-03-21

## 2022-12-16 PROBLEM — I61.9 LEFT-SIDED NONTRAUMATIC INTRACEREBRAL HEMORRHAGE (HCC): Status: ACTIVE | Noted: 2018-01-29

## 2022-12-16 PROBLEM — I10 BENIGN ESSENTIAL HYPERTENSION: Status: ACTIVE | Noted: 2018-11-15

## 2022-12-16 PROBLEM — U07.1 COVID-19: Status: ACTIVE | Noted: 2022-08-17

## 2022-12-16 PROBLEM — I10 HYPERTENSION: Status: ACTIVE | Noted: 2019-03-22

## 2022-12-16 PROBLEM — R20.0 NUMBNESS AND TINGLING OF RIGHT ARM: Status: ACTIVE | Noted: 2019-03-21

## 2022-12-16 PROBLEM — H91.90 DECREASED HEARING: Status: ACTIVE | Noted: 2021-01-08

## 2022-12-16 PROBLEM — I82.4Z9 ACUTE EMBOLISM AND THROMBOSIS OF UNSPECIFIED DEEP VEINS OF UNSPECIFIED DISTAL LOWER EXTREMITY: Status: ACTIVE | Noted: 2022-09-16

## 2022-12-16 PROBLEM — F52.8 PSYCHOSEXUAL DYSFUNCTION WITH INHIBITED SEXUAL EXCITEMENT: Status: ACTIVE | Noted: 2021-01-08
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
## 2022-12-16 PROBLEM — I63.9 CEREBROVASCULAR ACCIDENT (HCC): Status: ACTIVE | Noted: 2018-02-20

## 2022-12-16 PROCEDURE — 99204 OFFICE O/P NEW MOD 45 MIN: CPT | Performed by: NURSE PRACTITIONER

## 2022-12-16 PROCEDURE — G0463 HOSPITAL OUTPT CLINIC VISIT: HCPCS | Performed by: NURSE PRACTITIONER

## 2022-12-16 RX ORDER — ASPIRIN 81 MG/1
81 TABLET, CHEWABLE ORAL DAILY
COMMUNITY
Start: 2022-11-22

## 2022-12-16 RX ORDER — ATORVASTATIN CALCIUM 20 MG/1
20 TABLET, FILM COATED ORAL DAILY
COMMUNITY
Start: 2022-11-22

## 2022-12-16 RX ORDER — DIPHENHYDRAMINE HCL 50 MG
50 CAPSULE ORAL
COMMUNITY

## 2022-12-16 RX ORDER — LOPERAMIDE HYDROCHLORIDE 2 MG/1
2 CAPSULE ORAL 4 TIMES DAILY PRN
COMMUNITY
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
## 2022-12-23 NOTE — PROGRESS NOTES
Primary Care Provider  Babak Lema MD     Referring Provider  No ref. provider found     Chief Complaint  Follow-up (3-4 month follow up. )    Subjective          History of Presenting Illness  Patient is a 72-year-old male who presents for management of bilateral pulmonary emboli and left lower extremity DVT who presents for a follow-up visit today.  Patient had a hospitalization back in September 2022 at [facility] for acute bilateral pulmonary emboli and acute left lower extremity DVT and was started on Eliquis at that time. Since last office visit patient had a chest CT scan and a venous duplex study completed on 12/6/2022. Chest CT report states interval resolution of multiple bilateral pulmonary emboli.  No evidence of acute or chronic pulmonary embolus. No acute cardiopulmonary disease identified. Venous duplex study report states acute left lower extremity deep vein thrombosis noted in the proximal femoral, mid femoral and popliteal. Chronic left lower extremity deep vein thrombosis noted in the peroneal. Sub-acute left lower extremity deep vein thrombosis noted in the distal femoral. All other left sided veins appeared normal. Findings not significantly different from that noted on study dated 9/6/2022.  Patient reports that he has missed 8 doses of Eliquis since October 2022.  Patient states that he is under the care of hematologist, Dr. Fisher and is scheduled to follow-up with him in February 2023 for additional labs and work-up.  Patient states that since last visit his breathing is doing well.  Patient currently denies any dyspnea, cough, or wheezing. Patient denies fever, chills, night sweats, swollen glands in the head and neck, unintentional weight loss, hemoptysis, purulent sputum production, dysphagia, chest pain, palpitations, chest tightness, abdominal pain, nausea, vomiting, and diarrhea.  Patient also denies any myalgias, changes in sense of taste and/or smell, sore  throat, any other coronavirus or flu-like symptoms.  Patient denies any leg swelling, orthopnea, paroxysmal nocturnal dyspnea.  Patient also denies any hematuria, hematochezia, hematemesis, and epistaxis.  Patient is able to perform activities of daily living.        Review of Systems   Constitutional: Negative for activity change, appetite change, chills, diaphoresis, fatigue, fever, unexpected weight gain and unexpected weight loss.        Negative for Insomnia   HENT: Negative for congestion (Nasal), mouth sores, nosebleeds, postnasal drip, sore throat, swollen glands and trouble swallowing.         Negative for Thrush  Negative for Hoarseness  Negative for Allergies/Hay Fever  Negative for Recent Head injury  Negative for Ear Fullness  Negative for Nasal or Sinus pain  Negative for Dry lips  Negative for Nasal discharge   Respiratory: Negative for apnea, cough, chest tightness, shortness of breath and wheezing.         Negative for Hemoptysis  Negative for Pleuritic pain   Cardiovascular: Negative for chest pain, palpitations and leg swelling.        Negative for Claudication  Negative for Cyanosis  Negative for Dyspnea on exertion   Gastrointestinal: Negative for abdominal pain, diarrhea, nausea, vomiting and GERD.   Musculoskeletal: Negative for joint swelling and myalgias.        Negative for Joint pain  Negative for Joint stiffness   Skin: Negative for color change, dry skin, pallor and rash.   Neurological: Negative for syncope, weakness and headache.   Hematological: Negative for adenopathy. Does not bruise/bleed easily.        Family History   Problem Relation Age of Onset   • Heart failure Mother    • Heart failure Father    • Heart failure Sister    • Hypertension Sister    • Heart failure Brother    • Hypertension Brother         Social History     Socioeconomic History   • Marital status:    Tobacco Use   • Smoking status: Former     Packs/day: 0.25     Years: 0.50     Pack years: 0.13      Types: Cigarettes   • Smokeless tobacco: Never   • Tobacco comments:     Pt stated he smoked when he was 19 yrs old for 6 months/ 2-3 cigs per day   Vaping Use   • Vaping Use: Never used   Substance and Sexual Activity   • Alcohol use: Never   • Drug use: Never   • Sexual activity: Defer        Past Medical History:   Diagnosis Date   • Pulmonary embolism (HCC)    • Stroke (HCC)           There is no immunization history on file for this patient.    No Known Allergies       Current Outpatient Medications:   •  apixaban (ELIQUIS) 5 MG tablet tablet, Take 5 mg by mouth 2 (Two) Times a Day., Disp: , Rfl:   •  atorvastatin (LIPITOR) 20 MG tablet, Take 20 mg by mouth Daily., Disp: , Rfl:   •  diphenhydrAMINE (BENADRYL) 50 MG capsule, Take 50 mg by mouth., Disp: , Rfl:   •  loperamide (IMODIUM) 2 MG capsule, Take 2 mg by mouth 4 (Four) Times a Day As Needed for Diarrhea., Disp: , Rfl:   •  aspirin 81 MG chewable tablet, Chew 81 mg Daily., Disp: , Rfl:      Objective     Physical Exam  Vital Signs:   WDWN, Alert, NAD.    HEENT:  PERRL, EOMI.  OP, nares clear, no sinus tenderness  Neck:  Supple, no JVD, no thyromegaly.  Lymph: no axillary, cervical, supraclavicular lymphadenopathy noted bilaterally  Chest:  good aeration, clear to auscultation bilaterally, tympanic to percussion bilaterally, no work of breathing noted  CV: RRR, no MGR, pulses 2+, equal.  Abd:  Soft, NT, ND, + BS, no HSM  EXT:  no clubbing, no cyanosis, no edema, no joint tenderness  Neuro:  A&Ox3, CN grossly intact, no focal deficits.  Skin: No rashes or lesions noted.    /83 (BP Location: Right arm, Patient Position: Sitting, Cuff Size: Large Adult)   Pulse 80   Temp 98 °F (36.7 °C) (Tympanic)   Resp 16   Ht 170.2 cm (67.01\")   Wt 90.4 kg (199 lb 4.8 oz)   SpO2 98% Comment: room air  BMI 31.21 kg/m²         Result Review :   I have reviewed my last office visit note.  I also reviewed chest CT scan and a venous duplex study reports completed on  12/6/2022. See scanned reports.      Procedures:         Assessment and Plan      Assessment:  1.  Multiple bilateral PEs.  Likely provoked secondary to immobility. Resolved on chest CT report dated from 12/6/2022.  2.  Extensive left lower extremity DVT.  Patient will need to 6 months of therapy for provoked DVT/bilateral PE. Repeat venous duplex study shows acute LLE DVT. Pt admits to missing doses of Eliquis. Patient is under the care of Hematology.  3.  Recent COVID-19 infection.  4.  Dyspnea.  5.  Chronic kidney disease, stage III.  6.  Left leg pain: Improved since hospital stay per patient report.  7.  Coronary arteriosclerosis noted on CT imaging.  Patient states that he is scheduled to see cardiologist, Dr. Machado.   8.  Tobacco abuse of cigarettes in remission.  Patient states that he only smoked when he was 19 years old for 6 months and only smoked 2 to 3 cigarettes/day.  Not eligible for lung cancer screening.        Plan:  1.  Patient admits to missing 8 doses of Eliquis since September 2022. Patient is advised take Eliquis 5 mg twice daily every day as prescribed and to not miss any doses.  Medication compliance discussed with patient in the office today.  Risks of not taking medications as prescribed discussed with the patient.  Patient verbalized understanding and compliance. Patient is advised to take all medications as prescribed.  2.  Patient to follow-up with Dr. Fisher as scheduled.  3.  Follow-up with Dr. Machado, cardiologist as scheduled.  4.  Vaccination status:  patient declines flu, pneumonia, and COVID-19 vaccinations.  Discussed with patient the benefits of vaccination including decreased risk of severe illness, hospitalization and death related to flu, pneumonia, and COVID-19.  Patient verbalized understanding and will consider getting vaccinated.  Patient is advised to continue to follow CDC recommendations such as social distancing, wearing a mask, and washing hands for  least 20 seconds.  5.  Smoking status: Patient is a former cigarette smoker.  Not eligible for lung cancer screening.  6.  Patient to call the office, 911, or go to the ER with new or worsening symptoms.  7.  Follow-up in 2 to 3 months, sooner if needed.            Follow Up   Return for 2-3 months with Dr. Gomez.  Patient was given instructions and counseling regarding his condition or for health maintenance advice. Please see specific information pulled into the AVS if appropriate.

## 2022-12-30 ENCOUNTER — APPOINTMENT (OUTPATIENT)
Dept: ONCOLOGY | Facility: HOSPITAL | Age: 72
End: 2022-12-30

## 2023-01-04 ENCOUNTER — OFFICE VISIT (OUTPATIENT)
Dept: PULMONOLOGY | Facility: CLINIC | Age: 73
End: 2023-01-04
Payer: MEDICARE

## 2023-01-04 VITALS
BODY MASS INDEX: 31.28 KG/M2 | TEMPERATURE: 98 F | HEIGHT: 67 IN | HEART RATE: 80 BPM | SYSTOLIC BLOOD PRESSURE: 135 MMHG | OXYGEN SATURATION: 98 % | RESPIRATION RATE: 16 BRPM | WEIGHT: 199.3 LBS | DIASTOLIC BLOOD PRESSURE: 83 MMHG

## 2023-01-04 DIAGNOSIS — F17.201 TOBACCO ABUSE, IN REMISSION: ICD-10-CM

## 2023-01-04 DIAGNOSIS — I26.99 MULTIPLE PULMONARY EMBOLI: Primary | ICD-10-CM

## 2023-01-04 DIAGNOSIS — R06.00 DYSPNEA, UNSPECIFIED TYPE: ICD-10-CM

## 2023-01-04 DIAGNOSIS — I25.10 CORONARY ARTERIOSCLEROSIS: ICD-10-CM

## 2023-01-04 DIAGNOSIS — I82.462 ACUTE DEEP VEIN THROMBOSIS (DVT) OF CALF MUSCLE VEIN OF LEFT LOWER EXTREMITY: ICD-10-CM

## 2023-01-04 PROCEDURE — 99213 OFFICE O/P EST LOW 20 MIN: CPT | Performed by: NURSE PRACTITIONER

## 2023-01-11 ENCOUNTER — LAB (OUTPATIENT)
Dept: ONCOLOGY | Facility: HOSPITAL | Age: 73
End: 2023-01-11
Payer: MEDICARE

## 2023-01-11 DIAGNOSIS — I82.402 RECURRENT ACUTE DEEP VEIN THROMBOSIS (DVT) OF LEFT LOWER EXTREMITY: ICD-10-CM

## 2023-01-11 DIAGNOSIS — Z12.5 SCREENING FOR PROSTATE CANCER: ICD-10-CM

## 2023-01-11 LAB
ALBUMIN SERPL-MCNC: 4.5 G/DL (ref 3.5–5.2)
ALBUMIN/GLOB SERPL: 1.3 G/DL
ALP SERPL-CCNC: 66 U/L (ref 39–117)
ALT SERPL W P-5'-P-CCNC: 21 U/L (ref 1–41)
ANION GAP SERPL CALCULATED.3IONS-SCNC: 11.6 MMOL/L (ref 5–15)
AST SERPL-CCNC: 20 U/L (ref 1–40)
AT III PPP CHRO-ACNC: 92 % (ref 94–138)
BASOPHILS # BLD AUTO: 0.06 10*3/MM3 (ref 0–0.2)
BASOPHILS NFR BLD AUTO: 0.8 % (ref 0–1.5)
BILIRUB SERPL-MCNC: 0.6 MG/DL (ref 0–1.2)
BUN SERPL-MCNC: 23 MG/DL (ref 8–23)
BUN/CREAT SERPL: 15.1 (ref 7–25)
CALCIUM SPEC-SCNC: 9.8 MG/DL (ref 8.6–10.5)
CHLORIDE SERPL-SCNC: 102 MMOL/L (ref 98–107)
CO2 SERPL-SCNC: 25.4 MMOL/L (ref 22–29)
CREAT SERPL-MCNC: 1.52 MG/DL (ref 0.76–1.27)
DEPRECATED RDW RBC AUTO: 39.4 FL (ref 37–54)
EGFRCR SERPLBLD CKD-EPI 2021: 49 ML/MIN/1.73
EOSINOPHIL # BLD AUTO: 0.4 10*3/MM3 (ref 0–0.4)
EOSINOPHIL NFR BLD AUTO: 5.1 % (ref 0.3–6.2)
ERYTHROCYTE [DISTWIDTH] IN BLOOD BY AUTOMATED COUNT: 12.8 % (ref 12.3–15.4)
GLOBULIN UR ELPH-MCNC: 3.5 GM/DL
GLUCOSE SERPL-MCNC: 78 MG/DL (ref 65–99)
HCT VFR BLD AUTO: 44 % (ref 37.5–51)
HGB BLD-MCNC: 15.3 G/DL (ref 13–17.7)
IMM GRANULOCYTES # BLD AUTO: 0.05 10*3/MM3 (ref 0–0.05)
IMM GRANULOCYTES NFR BLD AUTO: 0.6 % (ref 0–0.5)
LYMPHOCYTES # BLD AUTO: 1.94 10*3/MM3 (ref 0.7–3.1)
LYMPHOCYTES NFR BLD AUTO: 24.8 % (ref 19.6–45.3)
MCH RBC QN AUTO: 29.5 PG (ref 26.6–33)
MCHC RBC AUTO-ENTMCNC: 34.8 G/DL (ref 31.5–35.7)
MCV RBC AUTO: 84.9 FL (ref 79–97)
MONOCYTES # BLD AUTO: 0.67 10*3/MM3 (ref 0.1–0.9)
MONOCYTES NFR BLD AUTO: 8.6 % (ref 5–12)
NEUTROPHILS NFR BLD AUTO: 4.7 10*3/MM3 (ref 1.7–7)
NEUTROPHILS NFR BLD AUTO: 60.1 % (ref 42.7–76)
NRBC BLD AUTO-RTO: 0 /100 WBC (ref 0–0.2)
PLATELET # BLD AUTO: 187 10*3/MM3 (ref 140–450)
PMV BLD AUTO: 9.6 FL (ref 6–12)
POTASSIUM SERPL-SCNC: 4 MMOL/L (ref 3.5–5.2)
PROT SERPL-MCNC: 8 G/DL (ref 6–8.5)
PSA SERPL-MCNC: 0.52 NG/ML (ref 0–4)
RBC # BLD AUTO: 5.18 10*6/MM3 (ref 4.14–5.8)
SODIUM SERPL-SCNC: 139 MMOL/L (ref 136–145)
WBC NRBC COR # BLD: 7.82 10*3/MM3 (ref 3.4–10.8)

## 2023-01-11 PROCEDURE — 85732 THROMBOPLASTIN TIME PARTIAL: CPT

## 2023-01-11 PROCEDURE — 85302 CLOT INHIBIT PROT C ANTIGEN: CPT

## 2023-01-11 PROCEDURE — 86146 BETA-2 GLYCOPROTEIN ANTIBODY: CPT

## 2023-01-11 PROCEDURE — 85613 RUSSELL VIPER VENOM DILUTED: CPT

## 2023-01-11 PROCEDURE — 85306 CLOT INHIBIT PROT S FREE: CPT

## 2023-01-11 PROCEDURE — G0103 PSA SCREENING: HCPCS

## 2023-01-11 PROCEDURE — 85303 CLOT INHIBIT PROT C ACTIVITY: CPT

## 2023-01-11 PROCEDURE — 85670 THROMBIN TIME PLASMA: CPT

## 2023-01-11 PROCEDURE — 85025 COMPLETE CBC W/AUTO DIFF WBC: CPT

## 2023-01-11 PROCEDURE — 81240 F2 GENE: CPT

## 2023-01-11 PROCEDURE — 80053 COMPREHEN METABOLIC PANEL: CPT

## 2023-01-11 PROCEDURE — 85305 CLOT INHIBIT PROT S TOTAL: CPT

## 2023-01-11 PROCEDURE — 81241 F5 GENE: CPT

## 2023-01-11 PROCEDURE — 86147 CARDIOLIPIN ANTIBODY EA IG: CPT

## 2023-01-11 PROCEDURE — 36415 COLL VENOUS BLD VENIPUNCTURE: CPT

## 2023-01-11 PROCEDURE — 85705 THROMBOPLASTIN INHIBITION: CPT

## 2023-01-11 PROCEDURE — 85300 ANTITHROMBIN III ACTIVITY: CPT

## 2023-01-12 LAB
CARDIOLIPIN IGA SER IA-ACNC: <9 APL U/ML (ref 0–11)
CARDIOLIPIN IGG SER IA-ACNC: <9 GPL U/ML (ref 0–14)
CARDIOLIPIN IGM SER IA-ACNC: 14 MPL U/ML (ref 0–12)

## 2023-01-13 LAB
APTT SCREEN TO CONFIRM RATIO: 0.92 RATIO (ref 0–1.34)
B2 GLYCOPROT1 IGA SER-ACNC: <9 GPI IGA UNITS (ref 0–25)
B2 GLYCOPROT1 IGG SER-ACNC: <9 GPI IGG UNITS (ref 0–20)
B2 GLYCOPROT1 IGM SER-ACNC: <9 GPI IGM UNITS (ref 0–32)
CONFIRM APTT/NORMAL: 51 SEC (ref 0–47.6)
DRVVT SCREEN TO CONFIRM RATIO: 1.2 RATIO (ref 0.8–1.2)
F5 GENE MUT ANL BLD/T: NORMAL
FACTOR II, DNA ANALYSIS: ABNORMAL
LA 2 SCREEN W REFLEX-IMP: ABNORMAL
MIXING DRVVT: 57.6 SEC (ref 0–40.4)
PROT C ACT/NOR PPP: 106 % (ref 73–180)
PROT S ACT/NOR PPP: 106 % (ref 63–140)
PROT S AG ACT/NOR PPP IA: 86 % (ref 60–150)
PROT S FREE AG ACT/NOR PPP IA: 105 % (ref 61–136)
SCREEN APTT: 34.2 SEC (ref 0–51.9)
SCREEN DRVVT: 75.2 SEC (ref 0–47)
THROMBIN TIME: 16 SEC (ref 0–23)

## 2023-01-14 LAB — PROT C AG ACT/NOR PPP IA: 84 % (ref 60–150)

## 2023-02-10 ENCOUNTER — TELEPHONE (OUTPATIENT)
Dept: ONCOLOGY | Facility: HOSPITAL | Age: 73
End: 2023-02-10

## 2023-02-10 NOTE — TELEPHONE ENCOUNTER
Caller: CHAU ANGLIN    Relationship: Emergency Contact    Best call back number: [phone]    Caller requesting test results: YES    What test was performed: LABS    When was the test performed: 1-11-23    Where was the test performed: OFFICE    Additional notes: PLEASE ADVISE

## 2023-02-17 ENCOUNTER — OFFICE VISIT (OUTPATIENT)
Dept: ONCOLOGY | Facility: HOSPITAL | Age: 73
End: 2023-02-17
Payer: MEDICARE

## 2023-02-17 VITALS
SYSTOLIC BLOOD PRESSURE: 140 MMHG | TEMPERATURE: 97.4 F | BODY MASS INDEX: 31.55 KG/M2 | WEIGHT: 201.5 LBS | DIASTOLIC BLOOD PRESSURE: 79 MMHG | HEART RATE: 68 BPM | RESPIRATION RATE: 18 BRPM | OXYGEN SATURATION: 99 %

## 2023-02-17 DIAGNOSIS — I26.99 MULTIPLE PULMONARY EMBOLI: Primary | ICD-10-CM

## 2023-02-17 DIAGNOSIS — I82.462 ACUTE DEEP VEIN THROMBOSIS (DVT) OF CALF MUSCLE VEIN OF LEFT LOWER EXTREMITY: ICD-10-CM

## 2023-02-17 PROBLEM — R31.9 HEMATURIA: Status: ACTIVE | Noted: 2023-02-15

## 2023-02-17 PROCEDURE — 99214 OFFICE O/P EST MOD 30 MIN: CPT | Performed by: INTERNAL MEDICINE

## 2023-02-17 PROCEDURE — G0463 HOSPITAL OUTPT CLINIC VISIT: HCPCS | Performed by: INTERNAL MEDICINE
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
## 2023-03-08 ENCOUNTER — OFFICE VISIT (OUTPATIENT)
Dept: PULMONOLOGY | Facility: CLINIC | Age: 73
End: 2023-03-08
Payer: MEDICARE

## 2023-03-08 VITALS
BODY MASS INDEX: 31.39 KG/M2 | HEART RATE: 76 BPM | DIASTOLIC BLOOD PRESSURE: 69 MMHG | WEIGHT: 200 LBS | RESPIRATION RATE: 16 BRPM | SYSTOLIC BLOOD PRESSURE: 126 MMHG | OXYGEN SATURATION: 99 % | HEIGHT: 67 IN

## 2023-03-08 DIAGNOSIS — I26.99 MULTIPLE PULMONARY EMBOLI: Primary | ICD-10-CM

## 2023-03-08 DIAGNOSIS — F17.201 TOBACCO ABUSE, IN REMISSION: ICD-10-CM

## 2023-03-08 DIAGNOSIS — D68.52 PROTHROMBIN MUTATION: ICD-10-CM

## 2023-03-08 DIAGNOSIS — I82.462 ACUTE DEEP VEIN THROMBOSIS (DVT) OF CALF MUSCLE VEIN OF LEFT LOWER EXTREMITY: ICD-10-CM

## 2023-03-08 PROCEDURE — 99213 OFFICE O/P EST LOW 20 MIN: CPT | Performed by: INTERNAL MEDICINE

## 2023-03-08 PROCEDURE — 3074F SYST BP LT 130 MM HG: CPT | Performed by: INTERNAL MEDICINE

## 2023-03-08 PROCEDURE — 3078F DIAST BP <80 MM HG: CPT | Performed by: INTERNAL MEDICINE

## 2023-03-08 NOTE — PROGRESS NOTES
Primary Care Provider  Babak Lema MD     Referring Provider  No ref. provider found     Chief Complaint  Multiple pulmonary emboli, Follow-up (2-3 Month ), and Chest Pain (Patient had chest pain last week. Patient was advised to go to the er if thi happens again )    Subjective          History of Presenting Illness  72-year-old male with history of bilateral PE and lower extremity DVT here for follow-up.  He is finishing month 6 of Eliquis.  Repeat imaging shows resolution of VTE.  He lately has been compliant with his Eliquis.  He did see hematology and they did lab work-up showing a prothrombin mutation.  Patient at this point is asymptomatic and doing well.  Has no dyspnea, coughing or wheezing.  Has no chest pain, leg swelling, orthopnea or PND. Patient currently denies any dyspnea, cough, or wheezing. Patient denies fever, chills, night sweats, swollen glands in the head and neck, unintentional weight loss, hemoptysis, purulent sputum production, dysphagia, chest pain, palpitations, chest tightness, abdominal pain, nausea, vomiting, and diarrhea.  Patient also denies any myalgias, changes in sense of taste and/or smell, sore throat, any other coronavirus or flu-like symptoms.  Patient denies any leg swelling, orthopnea, paroxysmal nocturnal dyspnea.  Patient also denies any hematuria, hematochezia, hematemesis, and epistaxis.  Patient is able to perform activities of daily living.        Review of Systems   Constitutional: Negative for activity change, appetite change, chills, diaphoresis, fatigue, fever, unexpected weight gain and unexpected weight loss.        Negative for Insomnia   HENT: Negative for congestion (Nasal), mouth sores, nosebleeds, postnasal drip, sore throat, swollen glands and trouble swallowing.         Negative for Thrush  Negative for Hoarseness  Negative for Allergies/Hay Fever  Negative for Recent Head injury  Negative for Ear Fullness  Negative for Nasal or Sinus  pain  Negative for Dry lips  Negative for Nasal discharge   Respiratory: Negative for apnea, cough, chest tightness, shortness of breath and wheezing.         Negative for Hemoptysis  Negative for Pleuritic pain   Cardiovascular: Negative for chest pain, palpitations and leg swelling.        Negative for Claudication  Negative for Cyanosis  Negative for Dyspnea on exertion   Gastrointestinal: Negative for abdominal pain, diarrhea, nausea, vomiting and GERD.   Musculoskeletal: Negative for joint swelling and myalgias.        Negative for Joint pain  Negative for Joint stiffness   Skin: Negative for color change, dry skin, pallor and rash.   Neurological: Negative for syncope, weakness and headache.   Hematological: Negative for adenopathy. Does not bruise/bleed easily.        Family History   Problem Relation Age of Onset   • Heart failure Mother    • Heart failure Father    • Heart failure Sister    • Hypertension Sister    • Heart failure Brother    • Hypertension Brother         Social History     Socioeconomic History   • Marital status:    Tobacco Use   • Smoking status: Former     Packs/day: 0.25     Years: 0.50     Pack years: 0.13     Types: Cigarettes     Start date:      Quit date:      Years since quittin.2     Passive exposure: Past   • Smokeless tobacco: Never   • Tobacco comments:     Pt stated he smoked when he was 19 yrs old for 6 months/ 2-3 cigs per day     Only smoked for about 3-4 months    Vaping Use   • Vaping Use: Never used   Substance and Sexual Activity   • Alcohol use: Never   • Drug use: Never   • Sexual activity: Defer        Past Medical History:   Diagnosis Date   • Pulmonary embolism (HCC)    • Stroke (HCC)           There is no immunization history on file for this patient.    No Known Allergies       Current Outpatient Medications:   •  apixaban (ELIQUIS) 5 MG tablet tablet, Take 1 tablet by mouth 2 (Two) Times a Day., Disp: , Rfl:   •  aspirin 81 MG chewable  "tablet, Chew 1 tablet Daily., Disp: , Rfl:   •  atorvastatin (LIPITOR) 20 MG tablet, Take 1 tablet by mouth Daily., Disp: , Rfl:   •  diphenhydrAMINE (BENADRYL) 50 MG capsule, Take 1 capsule by mouth., Disp: , Rfl:   •  loperamide (IMODIUM) 2 MG capsule, Take 1 capsule by mouth 4 (Four) Times a Day As Needed for Diarrhea., Disp: , Rfl:      Objective     Physical Exam  Vital Signs:   WDWN, Alert, NAD.    HEENT:  PERRL, EOMI.  OP, nares clear  Chest:  good aeration, clear to auscultation bilaterally, tympanic to percussion bilaterally, no work of breathing noted  CV: RRR, no MGR, pulses 2+, equal.  Abd:  Soft, NT, ND, + BS, no HSM  EXT:  no clubbing, no cyanosis, no edema  Neuro:  A&Ox3, CN grossly intact, no focal deficits.  Skin: No rashes or lesions noted.    /69 (BP Location: Left arm, Patient Position: Sitting, Cuff Size: Adult)   Pulse 76   Resp 16   Ht 170.2 cm (67\")   Wt 90.7 kg (200 lb)   SpO2 99% Comment: Room air  BMI 31.32 kg/m²         Result Review :   I personally reviewed Dr. Fisher's last office note as well as our services last office note.  Hypercoagulable labs personally reviewed and were consistent with a prothrombin mutation         Assessment and Plan    Patient Active Problem List   Diagnosis   • Multiple pulmonary emboli (HCC)   • Stage 3 chronic kidney disease (HCC)   • History of COVID-19   • Acute deep vein thrombosis (DVT) of calf muscle vein of left lower extremity (HCC)   • Left leg pain   • Acute embolism and thrombosis of unspecified deep veins of unspecified distal lower extremity (HCC)   • Atypical chest pain   • Benign essential hypertension   • Coronary arteriosclerosis   • COVID-19   • Decreased hearing   • Headache   • Hyperlipidemia   • Hypertension   • Insomnia   • Irritable bowel syndrome   • Cerebrovascular accident (HCC)   • Left-sided nontraumatic intracerebral hemorrhage (HCC)   • Localized swelling of left lower leg   • Numbness and tingling of right arm   • " Psychosexual dysfunction with inhibited sexual excitement   • Pulmonary embolism with infarction (HCC)   • Tick bite   • Transition of patient between care settings   • Weakness of right upper extremity   • Encounter for screening for other viral diseases   • Tobacco abuse, in remission   • Dyspnea   • Hematuria     Assessment:  Multiple bilateral PEs.  Likely provoked secondary to immobility. Resolved on chest CT report dated from 12/6/2022.  On month #6 of Eliquis.  Extensive left lower extremity DVT.  Completing 6 months of Eliquis  Prothrombin mutation  Chronic kidney disease, stage III.  Coronary arteriosclerosis noted on CT imaging.  Patient states that he is scheduled to see cardiologist, Dr. Machado.   Tobacco abuse of cigarettes in remission.  Patient states that he only smoked when he was 19 years old for 6 months and only smoked 2 to 3 cigarettes/day. Not eligible for lung cancer screening.    Plan:  I reviewed hematology's note.  I agree with finishing his Eliquis to complete 6 months and then be on 81 mg of aspirin daily afterwards  Encourage activity  Vaccination status:  patient declines flu, pneumonia, and COVID-19 vaccinations.  Discussed with patient the benefits of vaccination including decreased risk of severe illness, hospitalization and death related to flu, pneumonia, and COVID-19.   Smoking status: Patient is a former cigarette smoker.  Not eligible for lung cancer screening.    Follow Up   Return if symptoms worsen or fail to improve.  Patient was given instructions and counseling regarding his condition or for health maintenance advice. Please see specific information pulled into the AVS if appropriate.     Electronically signed by Booker Gomez MD, 03/08/23, 2:41 PM EST.

## 2023-08-18 ENCOUNTER — OFFICE VISIT (OUTPATIENT)
Dept: ONCOLOGY | Facility: HOSPITAL | Age: 73
End: 2023-08-18
Payer: MEDICARE

## 2023-08-18 VITALS
WEIGHT: 204.15 LBS | BODY MASS INDEX: 31.97 KG/M2 | TEMPERATURE: 97.7 F | OXYGEN SATURATION: 97 % | RESPIRATION RATE: 16 BRPM | HEART RATE: 89 BPM | SYSTOLIC BLOOD PRESSURE: 130 MMHG | DIASTOLIC BLOOD PRESSURE: 70 MMHG

## 2023-08-18 DIAGNOSIS — I82.4Z9: Primary | ICD-10-CM

## 2023-08-18 PROBLEM — R19.7 DIARRHEA: Status: ACTIVE | Noted: 2023-08-02

## 2023-08-18 PROCEDURE — G0463 HOSPITAL OUTPT CLINIC VISIT: HCPCS | Performed by: INTERNAL MEDICINE

## 2023-08-18 RX ORDER — CHOLESTYRAMINE 4 G/9G
POWDER, FOR SUSPENSION ORAL
COMMUNITY
Start: 2023-06-27

## 2023-08-18 RX ORDER — CEPHALEXIN 500 MG/1
1 CAPSULE ORAL EVERY 12 HOURS SCHEDULED
COMMUNITY
Start: 2023-05-02 | End: 2023-08-18

## 2023-08-18 RX ORDER — CEFDINIR 300 MG/1
CAPSULE ORAL
COMMUNITY
Start: 2023-05-01 | End: 2023-08-18
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
